# Patient Record
Sex: FEMALE | Race: WHITE | NOT HISPANIC OR LATINO
[De-identification: names, ages, dates, MRNs, and addresses within clinical notes are randomized per-mention and may not be internally consistent; named-entity substitution may affect disease eponyms.]

---

## 2016-05-26 RX ORDER — CARVEDILOL PHOSPHATE 80 MG/1
1 CAPSULE, EXTENDED RELEASE ORAL
Qty: 0 | Refills: 0 | DISCHARGE
Start: 2016-05-26 | End: 2016-06-25

## 2016-05-26 RX ORDER — ATORVASTATIN CALCIUM 80 MG/1
1 TABLET, FILM COATED ORAL
Qty: 0 | Refills: 0 | DISCHARGE
Start: 2016-05-26 | End: 2016-06-25

## 2017-01-13 ENCOUNTER — APPOINTMENT (OUTPATIENT)
Dept: GERIATRICS | Facility: CLINIC | Age: 82
End: 2017-01-13

## 2017-01-13 VITALS
BODY MASS INDEX: 27.38 KG/M2 | SYSTOLIC BLOOD PRESSURE: 142 MMHG | OXYGEN SATURATION: 90 % | DIASTOLIC BLOOD PRESSURE: 90 MMHG | HEART RATE: 61 BPM | HEIGHT: 61 IN | WEIGHT: 145 LBS

## 2017-01-13 DIAGNOSIS — R09.82 POSTNASAL DRIP: ICD-10-CM

## 2017-01-17 ENCOUNTER — APPOINTMENT (OUTPATIENT)
Dept: SURGICAL ONCOLOGY | Facility: CLINIC | Age: 82
End: 2017-01-17

## 2017-01-17 VITALS
SYSTOLIC BLOOD PRESSURE: 120 MMHG | RESPIRATION RATE: 16 BRPM | HEIGHT: 61 IN | DIASTOLIC BLOOD PRESSURE: 78 MMHG | HEART RATE: 60 BPM | BODY MASS INDEX: 27.38 KG/M2 | WEIGHT: 145 LBS

## 2017-07-14 ENCOUNTER — EMERGENCY (EMERGENCY)
Facility: HOSPITAL | Age: 82
LOS: 1 days | Discharge: HOME | End: 2017-07-14
Attending: EMERGENCY MEDICINE
Payer: MEDICARE

## 2017-07-14 VITALS
WEIGHT: 145.95 LBS | SYSTOLIC BLOOD PRESSURE: 153 MMHG | RESPIRATION RATE: 16 BRPM | OXYGEN SATURATION: 97 % | TEMPERATURE: 98 F | HEART RATE: 68 BPM | HEIGHT: 62 IN | DIASTOLIC BLOOD PRESSURE: 93 MMHG

## 2017-07-14 DIAGNOSIS — Z79.82 LONG TERM (CURRENT) USE OF ASPIRIN: ICD-10-CM
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
## 2017-07-14 DIAGNOSIS — Z95.0 PRESENCE OF CARDIAC PACEMAKER: Chronic | ICD-10-CM

## 2017-07-14 DIAGNOSIS — Z41.9 ENCOUNTER FOR PROCEDURE FOR PURPOSES OTHER THAN REMEDYING HEALTH STATE, UNSPECIFIED: Chronic | ICD-10-CM

## 2017-07-14 DIAGNOSIS — S01.01XA LACERATION WITHOUT FOREIGN BODY OF SCALP, INITIAL ENCOUNTER: ICD-10-CM

## 2017-07-14 DIAGNOSIS — Z98.89 OTHER SPECIFIED POSTPROCEDURAL STATES: Chronic | ICD-10-CM
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
## 2017-07-14 DIAGNOSIS — S09.90XA UNSPECIFIED INJURY OF HEAD, INITIAL ENCOUNTER: ICD-10-CM

## 2017-07-14 PROCEDURE — 70450 CT HEAD/BRAIN W/O DYE: CPT

## 2017-07-14 PROCEDURE — 99284 EMERGENCY DEPT VISIT MOD MDM: CPT | Mod: 25

## 2017-07-14 PROCEDURE — 12001 RPR S/N/AX/GEN/TRNK 2.5CM/<: CPT

## 2017-07-14 PROCEDURE — 70450 CT HEAD/BRAIN W/O DYE: CPT | Mod: 26

## 2017-07-14 NOTE — ED ADULT NURSE NOTE - OBJECTIVE STATEMENT
s/p slip and fall off bed and lurched forward and hit my head of the edge of the door, my only concern was the bleeding.

## 2017-07-14 NOTE — ED PROVIDER NOTE - OBJECTIVE STATEMENT
85 year old complaining of pain and laceration to top of head after slipping and falling out of bed today.  no loc.  takes asa once per day  no loc no change in mental status

## 2017-07-14 NOTE — ED PROVIDER NOTE - CARE PLAN
Principal Discharge DX:	Laceration of scalp, initial encounter  Secondary Diagnosis:	Traumatic injury of head, initial encounter

## 2017-07-24 ENCOUNTER — APPOINTMENT (OUTPATIENT)
Dept: GERIATRICS | Facility: CLINIC | Age: 82
End: 2017-07-24

## 2017-07-24 VITALS
DIASTOLIC BLOOD PRESSURE: 60 MMHG | SYSTOLIC BLOOD PRESSURE: 100 MMHG | HEIGHT: 61 IN | BODY MASS INDEX: 27.99 KG/M2 | OXYGEN SATURATION: 96 % | HEART RATE: 89 BPM | RESPIRATION RATE: 16 BRPM | WEIGHT: 148.25 LBS | TEMPERATURE: 97.4 F

## 2017-07-24 VITALS
WEIGHT: 148.25 LBS | TEMPERATURE: 97.4 F | BODY MASS INDEX: 27.99 KG/M2 | DIASTOLIC BLOOD PRESSURE: 60 MMHG | SYSTOLIC BLOOD PRESSURE: 100 MMHG | HEIGHT: 61 IN

## 2017-07-24 RX ORDER — MONTELUKAST 10 MG/1
10 TABLET, FILM COATED ORAL DAILY
Qty: 30 | Refills: 0 | Status: DISCONTINUED | COMMUNITY
Start: 2017-02-17 | End: 2017-07-24

## 2017-07-24 RX ORDER — FUROSEMIDE 40 MG/1
40 TABLET ORAL
Refills: 0 | Status: ACTIVE | COMMUNITY
Start: 2017-07-24

## 2017-07-24 RX ORDER — MOMETASONE 50 UG/1
50 SPRAY, METERED NASAL DAILY
Qty: 17 | Refills: 3 | Status: DISCONTINUED | COMMUNITY
Start: 2017-02-17 | End: 2017-07-24

## 2017-10-17 ENCOUNTER — APPOINTMENT (OUTPATIENT)
Dept: GERIATRICS | Facility: CLINIC | Age: 82
End: 2017-10-17
Payer: MEDICARE

## 2017-10-17 VITALS
RESPIRATION RATE: 16 BRPM | OXYGEN SATURATION: 92 % | BODY MASS INDEX: 27.95 KG/M2 | WEIGHT: 148.04 LBS | SYSTOLIC BLOOD PRESSURE: 130 MMHG | TEMPERATURE: 97.5 F | HEART RATE: 8 BPM | HEIGHT: 61 IN | DIASTOLIC BLOOD PRESSURE: 78 MMHG

## 2017-10-17 DIAGNOSIS — Z23 ENCOUNTER FOR IMMUNIZATION: ICD-10-CM

## 2017-10-17 PROCEDURE — 99214 OFFICE O/P EST MOD 30 MIN: CPT | Mod: 25

## 2017-10-17 PROCEDURE — 90662 IIV NO PRSV INCREASED AG IM: CPT

## 2017-10-17 PROCEDURE — G0008: CPT

## 2017-10-18 PROBLEM — Z23 NEED FOR IMMUNIZATION AGAINST INFLUENZA: Status: ACTIVE | Noted: 2017-10-17

## 2017-12-05 ENCOUNTER — TRANSCRIPTION ENCOUNTER (OUTPATIENT)
Age: 82
End: 2017-12-05

## 2017-12-05 ENCOUNTER — APPOINTMENT (OUTPATIENT)
Dept: SURGICAL ONCOLOGY | Facility: CLINIC | Age: 82
End: 2017-12-05

## 2018-01-23 ENCOUNTER — APPOINTMENT (OUTPATIENT)
Dept: SURGICAL ONCOLOGY | Facility: CLINIC | Age: 83
End: 2018-01-23
Payer: MEDICARE

## 2018-01-23 VITALS
TEMPERATURE: 98.2 F | DIASTOLIC BLOOD PRESSURE: 91 MMHG | HEART RATE: 60 BPM | WEIGHT: 153 LBS | BODY MASS INDEX: 28.91 KG/M2 | SYSTOLIC BLOOD PRESSURE: 165 MMHG

## 2018-01-23 PROCEDURE — 99214 OFFICE O/P EST MOD 30 MIN: CPT

## 2018-03-16 ENCOUNTER — APPOINTMENT (OUTPATIENT)
Dept: GERIATRICS | Facility: CLINIC | Age: 83
End: 2018-03-16
Payer: MEDICARE

## 2018-03-16 VITALS
OXYGEN SATURATION: 95 % | TEMPERATURE: 97.6 F | HEART RATE: 61 BPM | RESPIRATION RATE: 16 BRPM | HEIGHT: 61 IN | DIASTOLIC BLOOD PRESSURE: 84 MMHG | SYSTOLIC BLOOD PRESSURE: 150 MMHG

## 2018-03-16 DIAGNOSIS — K30 FUNCTIONAL DYSPEPSIA: ICD-10-CM

## 2018-03-16 PROCEDURE — 99214 OFFICE O/P EST MOD 30 MIN: CPT | Mod: GC

## 2018-03-16 RX ORDER — GLUCOSAMINE/MSM/CHONDROIT SULF 500-166.6
20 TABLET ORAL
Refills: 0 | Status: ACTIVE | COMMUNITY
Start: 2018-03-16

## 2018-03-29 ENCOUNTER — CLINICAL ADVICE (OUTPATIENT)
Age: 83
End: 2018-03-29

## 2018-06-29 ENCOUNTER — APPOINTMENT (OUTPATIENT)
Dept: GERIATRICS | Facility: CLINIC | Age: 83
End: 2018-06-29
Payer: MEDICARE

## 2018-06-29 VITALS
BODY MASS INDEX: 30.21 KG/M2 | HEIGHT: 61 IN | DIASTOLIC BLOOD PRESSURE: 90 MMHG | RESPIRATION RATE: 16 BRPM | WEIGHT: 160 LBS | HEART RATE: 100 BPM | TEMPERATURE: 97.6 F | OXYGEN SATURATION: 99 % | SYSTOLIC BLOOD PRESSURE: 140 MMHG

## 2018-06-29 DIAGNOSIS — R26.81 UNSTEADINESS ON FEET: ICD-10-CM

## 2018-06-29 DIAGNOSIS — J34.89 OTHER SPECIFIED DISORDERS OF NOSE AND NASAL SINUSES: ICD-10-CM

## 2018-06-29 DIAGNOSIS — Z95.0 PRESENCE OF CARDIAC PACEMAKER: ICD-10-CM

## 2018-06-29 DIAGNOSIS — I35.0 NONRHEUMATIC AORTIC (VALVE) STENOSIS: ICD-10-CM

## 2018-06-29 DIAGNOSIS — E78.5 HYPERLIPIDEMIA, UNSPECIFIED: ICD-10-CM

## 2018-06-29 DIAGNOSIS — M48.061 SPINAL STENOSIS, LUMBAR REGION WITHOUT NEUROGENIC CLAUDICATION: ICD-10-CM

## 2018-06-29 PROCEDURE — 99215 OFFICE O/P EST HI 40 MIN: CPT

## 2018-06-29 RX ORDER — IPRATROPIUM BROMIDE 21 UG/1
0.03 SPRAY NASAL
Qty: 1 | Refills: 0 | Status: DISCONTINUED | COMMUNITY
Start: 2017-07-24 | End: 2018-06-29

## 2018-08-03 ENCOUNTER — APPOINTMENT (OUTPATIENT)
Dept: GERIATRICS | Facility: CLINIC | Age: 83
End: 2018-08-03
Payer: MEDICARE

## 2018-08-03 VITALS
BODY MASS INDEX: 29.83 KG/M2 | WEIGHT: 158 LBS | SYSTOLIC BLOOD PRESSURE: 120 MMHG | HEIGHT: 61 IN | RESPIRATION RATE: 15 BRPM | DIASTOLIC BLOOD PRESSURE: 70 MMHG | TEMPERATURE: 98.2 F | HEART RATE: 69 BPM | OXYGEN SATURATION: 98 %

## 2018-08-03 DIAGNOSIS — E05.90 THYROTOXICOSIS, UNSPECIFIED W/OUT THYROTOXIC CRISIS OR STORM: ICD-10-CM

## 2018-08-03 DIAGNOSIS — R60.0 LOCALIZED EDEMA: ICD-10-CM

## 2018-08-03 DIAGNOSIS — I10 ESSENTIAL (PRIMARY) HYPERTENSION: ICD-10-CM

## 2018-08-03 DIAGNOSIS — F32.9 MAJOR DEPRESSIVE DISORDER, SINGLE EPISODE, UNSPECIFIED: ICD-10-CM

## 2018-08-03 PROCEDURE — 99214 OFFICE O/P EST MOD 30 MIN: CPT | Mod: GC

## 2018-08-06 ENCOUNTER — RESULT REVIEW (OUTPATIENT)
Age: 83
End: 2018-08-06

## 2018-08-06 DIAGNOSIS — R79.89 OTHER SPECIFIED ABNORMAL FINDINGS OF BLOOD CHEMISTRY: ICD-10-CM

## 2018-08-06 LAB
ANION GAP SERPL CALC-SCNC: 17 MMOL/L
BUN SERPL-MCNC: 56 MG/DL
CALCIUM SERPL-MCNC: 9.6 MG/DL
CHLORIDE SERPL-SCNC: 100 MMOL/L
CO2 SERPL-SCNC: 23 MMOL/L
CREAT SERPL-MCNC: 2.05 MG/DL
GLUCOSE SERPL-MCNC: 99 MG/DL
POTASSIUM SERPL-SCNC: 5.3 MMOL/L
SODIUM SERPL-SCNC: 140 MMOL/L

## 2018-08-07 ENCOUNTER — CLINICAL ADVICE (OUTPATIENT)
Age: 83
End: 2018-08-07

## 2018-08-28 ENCOUNTER — RX RENEWAL (OUTPATIENT)
Age: 83
End: 2018-08-28

## 2018-08-28 RX ORDER — SERTRALINE 25 MG/1
25 TABLET, FILM COATED ORAL
Qty: 90 | Refills: 1 | Status: ACTIVE | COMMUNITY
Start: 2018-06-29 | End: 1900-01-01

## 2018-09-14 ENCOUNTER — APPOINTMENT (OUTPATIENT)
Dept: GERIATRICS | Facility: CLINIC | Age: 83
End: 2018-09-14

## 2018-10-10 ENCOUNTER — INPATIENT (INPATIENT)
Facility: HOSPITAL | Age: 83
LOS: 4 days | Discharge: NURSING, REHAB OR LONG-TERM CARE | DRG: 481 | End: 2018-10-15
Attending: HOSPITALIST | Admitting: HOSPITALIST
Payer: MEDICARE

## 2018-10-10 ENCOUNTER — TRANSCRIPTION ENCOUNTER (OUTPATIENT)
Age: 83
End: 2018-10-10

## 2018-10-10 VITALS
OXYGEN SATURATION: 94 % | HEART RATE: 62 BPM | TEMPERATURE: 98 F | RESPIRATION RATE: 17 BRPM | WEIGHT: 179.9 LBS | SYSTOLIC BLOOD PRESSURE: 134 MMHG | DIASTOLIC BLOOD PRESSURE: 74 MMHG

## 2018-10-10 DIAGNOSIS — Z98.89 OTHER SPECIFIED POSTPROCEDURAL STATES: Chronic | ICD-10-CM

## 2018-10-10 DIAGNOSIS — Z41.9 ENCOUNTER FOR PROCEDURE FOR PURPOSES OTHER THAN REMEDYING HEALTH STATE, UNSPECIFIED: Chronic | ICD-10-CM

## 2018-10-10 DIAGNOSIS — Z95.0 PRESENCE OF CARDIAC PACEMAKER: Chronic | ICD-10-CM

## 2018-10-10 DIAGNOSIS — E78.5 HYPERLIPIDEMIA, UNSPECIFIED: ICD-10-CM

## 2018-10-10 DIAGNOSIS — S72.009A FRACTURE OF UNSPECIFIED PART OF NECK OF UNSPECIFIED FEMUR, INITIAL ENCOUNTER FOR CLOSED FRACTURE: ICD-10-CM

## 2018-10-10 DIAGNOSIS — Z29.9 ENCOUNTER FOR PROPHYLACTIC MEASURES, UNSPECIFIED: ICD-10-CM

## 2018-10-10 DIAGNOSIS — N18.9 CHRONIC KIDNEY DISEASE, UNSPECIFIED: ICD-10-CM

## 2018-10-10 DIAGNOSIS — I10 ESSENTIAL (PRIMARY) HYPERTENSION: ICD-10-CM

## 2018-10-10 LAB
ALBUMIN SERPL ELPH-MCNC: 3.5 G/DL — SIGNIFICANT CHANGE UP (ref 3.5–5)
ALP SERPL-CCNC: 61 U/L — SIGNIFICANT CHANGE UP (ref 40–120)
ALT FLD-CCNC: 18 U/L DA — SIGNIFICANT CHANGE UP (ref 10–60)
ANION GAP SERPL CALC-SCNC: 10 MMOL/L — SIGNIFICANT CHANGE UP (ref 5–17)
APTT BLD: 33.7 SEC — SIGNIFICANT CHANGE UP (ref 27.5–37.4)
AST SERPL-CCNC: 20 U/L — SIGNIFICANT CHANGE UP (ref 10–40)
BASOPHILS # BLD AUTO: 0.1 K/UL — SIGNIFICANT CHANGE UP (ref 0–0.2)
BASOPHILS NFR BLD AUTO: 1.1 % — SIGNIFICANT CHANGE UP (ref 0–2)
BILIRUB SERPL-MCNC: 0.6 MG/DL — SIGNIFICANT CHANGE UP (ref 0.2–1.2)
BUN SERPL-MCNC: 71 MG/DL — HIGH (ref 7–18)
CALCIUM SERPL-MCNC: 9.1 MG/DL — SIGNIFICANT CHANGE UP (ref 8.4–10.5)
CHLORIDE SERPL-SCNC: 107 MMOL/L — SIGNIFICANT CHANGE UP (ref 96–108)
CO2 SERPL-SCNC: 23 MMOL/L — SIGNIFICANT CHANGE UP (ref 22–31)
CREAT SERPL-MCNC: 2.04 MG/DL — HIGH (ref 0.5–1.3)
EOSINOPHIL # BLD AUTO: 0.1 K/UL — SIGNIFICANT CHANGE UP (ref 0–0.5)
EOSINOPHIL NFR BLD AUTO: 0.8 % — SIGNIFICANT CHANGE UP (ref 0–6)
GLUCOSE SERPL-MCNC: 108 MG/DL — HIGH (ref 70–99)
HCT VFR BLD CALC: 38.3 % — SIGNIFICANT CHANGE UP (ref 34.5–45)
HGB BLD-MCNC: 12.5 G/DL — SIGNIFICANT CHANGE UP (ref 11.5–15.5)
INR BLD: 1.04 RATIO — SIGNIFICANT CHANGE UP (ref 0.88–1.16)
LYMPHOCYTES # BLD AUTO: 1.5 K/UL — SIGNIFICANT CHANGE UP (ref 1–3.3)
LYMPHOCYTES # BLD AUTO: 11.9 % — LOW (ref 13–44)
MCHC RBC-ENTMCNC: 29.9 PG — SIGNIFICANT CHANGE UP (ref 27–34)
MCHC RBC-ENTMCNC: 32.5 GM/DL — SIGNIFICANT CHANGE UP (ref 32–36)
MCV RBC AUTO: 92 FL — SIGNIFICANT CHANGE UP (ref 80–100)
MONOCYTES # BLD AUTO: 0.6 K/UL — SIGNIFICANT CHANGE UP (ref 0–0.9)
MONOCYTES NFR BLD AUTO: 5.1 % — SIGNIFICANT CHANGE UP (ref 2–14)
NEUTROPHILS # BLD AUTO: 10.3 K/UL — HIGH (ref 1.8–7.4)
NEUTROPHILS NFR BLD AUTO: 81.1 % — HIGH (ref 43–77)
PLATELET # BLD AUTO: 234 K/UL — SIGNIFICANT CHANGE UP (ref 150–400)
POTASSIUM SERPL-MCNC: 4.6 MMOL/L — SIGNIFICANT CHANGE UP (ref 3.5–5.3)
POTASSIUM SERPL-SCNC: 4.6 MMOL/L — SIGNIFICANT CHANGE UP (ref 3.5–5.3)
PROT SERPL-MCNC: 7.4 G/DL — SIGNIFICANT CHANGE UP (ref 6–8.3)
PROTHROM AB SERPL-ACNC: 11.4 SEC — SIGNIFICANT CHANGE UP (ref 9.8–12.7)
RBC # BLD: 4.17 M/UL — SIGNIFICANT CHANGE UP (ref 3.8–5.2)
RBC # FLD: 14 % — SIGNIFICANT CHANGE UP (ref 10.3–14.5)
SODIUM SERPL-SCNC: 140 MMOL/L — SIGNIFICANT CHANGE UP (ref 135–145)
WBC # BLD: 12.7 K/UL — HIGH (ref 3.8–10.5)
WBC # FLD AUTO: 12.7 K/UL — HIGH (ref 3.8–10.5)

## 2018-10-10 PROCEDURE — 99285 EMERGENCY DEPT VISIT HI MDM: CPT

## 2018-10-10 PROCEDURE — 70450 CT HEAD/BRAIN W/O DYE: CPT | Mod: 26

## 2018-10-10 PROCEDURE — 72192 CT PELVIS W/O DYE: CPT | Mod: 26

## 2018-10-10 PROCEDURE — 71045 X-RAY EXAM CHEST 1 VIEW: CPT | Mod: 26

## 2018-10-10 PROCEDURE — 73551 X-RAY EXAM OF FEMUR 1: CPT | Mod: 26,RT

## 2018-10-10 PROCEDURE — 99223 1ST HOSP IP/OBS HIGH 75: CPT | Mod: GC

## 2018-10-10 RX ORDER — INFLUENZA VIRUS VACCINE 15; 15; 15; 15 UG/.5ML; UG/.5ML; UG/.5ML; UG/.5ML
0.5 SUSPENSION INTRAMUSCULAR ONCE
Qty: 0 | Refills: 0 | Status: DISCONTINUED | OUTPATIENT
Start: 2018-10-10 | End: 2018-10-11

## 2018-10-10 RX ORDER — FEXOFENADINE HCL 30 MG
0 TABLET ORAL
Qty: 0 | Refills: 0 | COMMUNITY

## 2018-10-10 RX ORDER — TRAMADOL HYDROCHLORIDE 50 MG/1
25 TABLET ORAL EVERY 8 HOURS
Qty: 0 | Refills: 0 | Status: DISCONTINUED | OUTPATIENT
Start: 2018-10-10 | End: 2018-10-11

## 2018-10-10 RX ORDER — PANTOPRAZOLE SODIUM 20 MG/1
40 TABLET, DELAYED RELEASE ORAL
Qty: 0 | Refills: 0 | Status: DISCONTINUED | OUTPATIENT
Start: 2018-10-10 | End: 2018-10-11

## 2018-10-10 RX ORDER — MORPHINE SULFATE 50 MG/1
0.5 CAPSULE, EXTENDED RELEASE ORAL EVERY 4 HOURS
Qty: 0 | Refills: 0 | Status: DISCONTINUED | OUTPATIENT
Start: 2018-10-10 | End: 2018-10-11

## 2018-10-10 RX ORDER — ASPIRIN/CALCIUM CARB/MAGNESIUM 324 MG
81 TABLET ORAL DAILY
Qty: 0 | Refills: 0 | Status: DISCONTINUED | OUTPATIENT
Start: 2018-10-10 | End: 2018-10-11

## 2018-10-10 RX ORDER — CARVEDILOL PHOSPHATE 80 MG/1
0 CAPSULE, EXTENDED RELEASE ORAL
Qty: 0 | Refills: 0 | COMMUNITY

## 2018-10-10 RX ORDER — FUROSEMIDE 40 MG
1 TABLET ORAL
Qty: 0 | Refills: 0 | COMMUNITY

## 2018-10-10 RX ORDER — SERTRALINE 25 MG/1
25 TABLET, FILM COATED ORAL AT BEDTIME
Qty: 0 | Refills: 0 | Status: DISCONTINUED | OUTPATIENT
Start: 2018-10-10 | End: 2018-10-11

## 2018-10-10 RX ORDER — OMEPRAZOLE 10 MG/1
1 CAPSULE, DELAYED RELEASE ORAL
Qty: 0 | Refills: 0 | COMMUNITY

## 2018-10-10 RX ORDER — ATORVASTATIN CALCIUM 80 MG/1
20 TABLET, FILM COATED ORAL AT BEDTIME
Qty: 0 | Refills: 0 | Status: DISCONTINUED | OUTPATIENT
Start: 2018-10-10 | End: 2018-10-11

## 2018-10-10 RX ORDER — HEPARIN SODIUM 5000 [USP'U]/ML
5000 INJECTION INTRAVENOUS; SUBCUTANEOUS EVERY 8 HOURS
Qty: 0 | Refills: 0 | Status: DISCONTINUED | OUTPATIENT
Start: 2018-10-10 | End: 2018-10-11

## 2018-10-10 RX ORDER — CHOLECALCIFEROL (VITAMIN D3) 125 MCG
2000 CAPSULE ORAL DAILY
Qty: 0 | Refills: 0 | Status: DISCONTINUED | OUTPATIENT
Start: 2018-10-10 | End: 2018-10-11

## 2018-10-10 RX ORDER — ACETAMINOPHEN 500 MG
650 TABLET ORAL EVERY 6 HOURS
Qty: 0 | Refills: 0 | Status: DISCONTINUED | OUTPATIENT
Start: 2018-10-10 | End: 2018-10-11

## 2018-10-10 RX ORDER — ASPIRIN/CALCIUM CARB/MAGNESIUM 324 MG
1 TABLET ORAL
Qty: 0 | Refills: 0 | COMMUNITY

## 2018-10-10 RX ORDER — ACETAMINOPHEN 500 MG
650 TABLET ORAL ONCE
Qty: 0 | Refills: 0 | Status: COMPLETED | OUTPATIENT
Start: 2018-10-10 | End: 2018-10-10

## 2018-10-10 RX ORDER — FUROSEMIDE 40 MG
60 TABLET ORAL DAILY
Qty: 0 | Refills: 0 | Status: DISCONTINUED | OUTPATIENT
Start: 2018-10-10 | End: 2018-10-11

## 2018-10-10 RX ORDER — CARVEDILOL PHOSPHATE 80 MG/1
6.25 CAPSULE, EXTENDED RELEASE ORAL
Qty: 0 | Refills: 0 | Status: DISCONTINUED | OUTPATIENT
Start: 2018-10-10 | End: 2018-10-11

## 2018-10-10 RX ORDER — CARVEDILOL PHOSPHATE 80 MG/1
3.12 CAPSULE, EXTENDED RELEASE ORAL
Qty: 0 | Refills: 0 | Status: DISCONTINUED | OUTPATIENT
Start: 2018-10-10 | End: 2018-10-11

## 2018-10-10 RX ORDER — MORPHINE SULFATE 50 MG/1
4 CAPSULE, EXTENDED RELEASE ORAL ONCE
Qty: 0 | Refills: 0 | Status: DISCONTINUED | OUTPATIENT
Start: 2018-10-10 | End: 2018-10-10

## 2018-10-10 RX ORDER — FLUTICASONE PROPIONATE 220 MCG
0 AEROSOL WITH ADAPTER (GRAM) INHALATION
Qty: 0 | Refills: 0 | COMMUNITY

## 2018-10-10 RX ORDER — SODIUM CHLORIDE 9 MG/ML
500 INJECTION INTRAMUSCULAR; INTRAVENOUS; SUBCUTANEOUS
Qty: 0 | Refills: 0 | Status: DISCONTINUED | OUTPATIENT
Start: 2018-10-10 | End: 2018-10-11

## 2018-10-10 RX ADMIN — SERTRALINE 25 MILLIGRAM(S): 25 TABLET, FILM COATED ORAL at 21:23

## 2018-10-10 RX ADMIN — HEPARIN SODIUM 5000 UNIT(S): 5000 INJECTION INTRAVENOUS; SUBCUTANEOUS at 21:23

## 2018-10-10 RX ADMIN — ATORVASTATIN CALCIUM 20 MILLIGRAM(S): 80 TABLET, FILM COATED ORAL at 21:23

## 2018-10-10 RX ADMIN — Medication 650 MILLIGRAM(S): at 16:30

## 2018-10-10 RX ADMIN — CARVEDILOL PHOSPHATE 6.25 MILLIGRAM(S): 80 CAPSULE, EXTENDED RELEASE ORAL at 21:23

## 2018-10-10 RX ADMIN — Medication 650 MILLIGRAM(S): at 17:00

## 2018-10-10 RX ADMIN — MORPHINE SULFATE 0.5 MILLIGRAM(S): 50 CAPSULE, EXTENDED RELEASE ORAL at 21:22

## 2018-10-10 NOTE — ED PROVIDER NOTE - PMH
Complete heart block  2010  Endogenous hyperlipemia    Essential hypertension    Hypertension    PAF (paroxysmal atrial fibrillation)  1/7/2016  Thyroid nodule

## 2018-10-10 NOTE — ED ADULT NURSE NOTE - ED STAT RN HANDOFF DETAILS
endorsed pt to RN Fallon in B1 in stable condition for continuation of care. pt a&ox3, admitted for right hip fracture. 20G LAC.

## 2018-10-10 NOTE — CONSULT NOTE ADULT - SUBJECTIVE AND OBJECTIVE BOX
[place]  [id number]     ORTHOPEDIC CONSULT:    Diagnosis:  Rt Hip subtrochanteric Fracture     89yFemaleHPI:  ortho called to evaluate pt in ER. JOSLYN today s/p fall while [place] onto her right hip with no head trauma as per pt.  pain of the right hip increases with weight bearing and ROM. Pt cannot ambulate nor bear weight.   Pt denies Chest pain, SOB, dyspnea, paresthesias, N/V/D, abdominal pain, syncope, or pain anywhere else.   +community ambulator with cane at baseline.      Vital Signs Last 24 Hrs  T(C): 36.7 (10 Oct 2018 14:55), Max: 36.7 (10 Oct 2018 14:55)  T(F): 98 (10 Oct 2018 14:55), Max: 98 (10 Oct 2018 14:55)  HR: 62 (10 Oct 2018 14:55) (62 - 62)  BP: 134/74 (10 Oct 2018 14:55) (134/74 - 134/74)  BP(mean): --  RR: 17 (10 Oct 2018 14:55) (17 - 17)  SpO2: 94% (10 Oct 2018 14:55) (94% - 94%)  I&O's Detail      Physical Exam:    General: AAOx3, NAD, resting comfortably in bed.    Rt Hip:   Skin is pink and warm. Tender at the fracture site. Decreased ROM of the affected hip due to pain. SILT.  Positive logroll test.  Lower extremity:  No calf tenderness, calves are soft. 2+pulses. NVI. 5/5 Strength of EHL/TA/gastrocnemius B/L.  Good capillary refill. Warm, well-perfused.                           12.5   12.7  )-----------( 234      ( 10 Oct 2018 18:08 )             38.3     10-10    140  |  107  |  71<H>  ----------------------------<  108<H>  4.6   |  23  |  2.04<H>    Ca    9.1      10 Oct 2018 18:08    TPro  7.4  /  Alb  3.5  /  TBili  0.6  /  DBili  x   /  AST  20  /  ALT  18  /  AlkPhos  61  10-10    PT/INR - ( 10 Oct 2018 18:08 )   PT: 11.4 sec;   INR: 1.04 ratio         PTT - ( 10 Oct 2018 18:08 )  PTT:33.7 sec    Radiology:  < from: Xray Femur 1 View, Right (10.10.18 @ 16:03) >    EXAM:  XR FEMUR 1 VIEW RT                            PROCEDURE DATE:  10/10/2018          INTERPRETATION:  Right femur. Patient had a fall with local trauma. 4   images obtained.    Arterial calcifications are noted.    There is a comminuted intertrochanteric fracture with comminution of the   lesser trochanter and upward displacement at the fracture site. There is   mild spurring of the outer right acetabular corner.    There is mild knee degeneration.    IMPRESSION: Intertrochanteric fractureas above.                ALEC LOWRY M.D., ATTENDING RADIOLOGIST  This document has been electronically signed. Oct 10 2018  4:05PM                < end of copied text >      Impression:  89yFemale with _Rt_ Hip subtrochanteric Fracture    Plan:  -  Pain management  -  Dvt prophylaxis with Venodynes  -  Keep NPO after midnight tonight  -  Surgeon:  Dr. Bravo  -  Procedure:  Rt hip IM nailing  -  For Surgery possibly today if ok with patient.  -  Medical Optimization/   -  pt and family (son) requesting possible [facility] [place] [facility] [place]  -  Consent: Pending  -  Case d/w   - Fracture care, bedrest. NWB of the affected extremity.

## 2018-10-10 NOTE — H&P ADULT - HISTORY OF PRESENT ILLNESS
89 y/o Female from home lives alone and walk with walker pt w/ hx of HTN, endogenous hyperlipemia, paroxysmal Afib, cardiac pacemaker (Dr. Steven Goldberg), arthritis, macular degeneration, cataract P/w c/o R leg pain 7/10 in intensity, sharp, continuous, non radiating, s/p fall x today. Pt was at [facility] when she had mechanical fall onto R side after her foot got stuck. Worse when moving R leg. Better when not moving. Denies any Dizziness, Palpitations, or any sensation changes before she fell down.  Denies LOC, head trauma, HA, abd pain, CP, and any other complaints. NKDA, Pt denied Smoking and alcohol drinking.      In the ED pt got tylenol it improved pain little bit, pt had CT scan of head which was negative for acute intracranial pathology, CT bony pelvis showed Acute comminuted angulated right intertrochanteric fracture.   GOC discussed with Son: pt DNR, not DNI.

## 2018-10-10 NOTE — ED ADULT NURSE NOTE - ED STAT RN HANDOFF DETAILS 2
received pt.in bed at 1910 pt. is  awake and responsive.c/o rt.hip pain.morphine ivp  given as ordered. transfer to  519B report given to skye foreman.pt,not in distress

## 2018-10-10 NOTE — ED PROVIDER NOTE - PSH
Elective surgery  excision of thyroid nodule  History of cardiac pacemaker  2010  No significant past surgical history    Status post pericardiocentesis  5/18/2016

## 2018-10-10 NOTE — H&P ADULT - PROBLEM SELECTOR PLAN 4
IMPROVE VTE Individual Risk Assessment    RISK                                                                Points    [  ] Previous VTE                                                  3  [  ] Thrombophilia                                               2  [  ] Lower limb paralysis                                      2        (unable to hold up >15 seconds)    [  ] Current Cancer                                              2         (within 6 months)  [x  ] Immobilization > 24 hrs                                1  [  ] ICU/CCU stay > 24 hours                              1  [x  ] Age > 60                                                      1  IMPROVE VTE Score 2 will give heparin sc for DVT ppx and Pantoprazole for GI ppx Creatinine 2.04  egfr: 21 (stage 4)  F/u BMP in am

## 2018-10-10 NOTE — H&P ADULT - ATTENDING COMMENTS
Patient seen/evaluated at bedside in the ED 10/10/18. I agree with the resident progress note/outlined plan of care. My independent findings and conclusions are documented.    89 y/o f w/ pmhx of severe aortic stenosis, CHF/LV dysfunction, atrial fibrillation (no AC), h/o complete heart block s/p PPM, CKD,  p/w w/ right intertrochanteric fracture s/p mechanical fall. Pt denies chest pain, sob, LH, palpitations, orthopnea/PND. No nausea/vomiting. PPM was interrogated 2 months ago. Per son was informed there were no events noted and that there were 4 yrs of battery life remaining.   Son is considering possible transfer to [place] where patient's cardiologist is based.  Son will decide then whether to transfer or proceed with recommended IM nailing at [facility].  Patient had nuclear stress test in 2016 suggestive of mild ananth-infarct    pmhx/meds/all/ros/famhx/sochx/surghx   physical exam as per details of the H&P  AAOx3 NAD  CTAB/l no accessory muscle use  S1S2 RRR II/VI SM  soft, NT, ND, + BS, no guarding/rebound  RLE edematous, shortened and externally rotated    nuclear stress test May 2016  Abnormal Study  * Chest Pain: No chest pain with administration of  Regadenoson.  * Symptom: flushing.  * HR Response: Appropriate.  * BP Response: Appropriate.  * Heart Rhythm: Paced Rhythm - 78 BPM.  * ECG Abnormalities: ECG changes could not be interpreted  due to paced rhythm.  * Arrhythmia: None.  * The left ventricle was enlarged. There is a  medium-sized, mild defect in the mid to distal  anteroseptal wall that is reversible suggestive of  ischemia.  There is a small, moderate defect in the apex  that is mostly fixed suggestive of infarct with mild  ananth-infarct ischemia.  There is a small, mild defect in  the basal to mid inferoseptal walls that is partially  reversible suggestive of mild ischemia with a small area  of scar in the basal inferoseptum.  * Post-stress gated wall motion analysis was performed  (LVEF = 26 %;LVEDV = 166 ml.) revealing globally reduced  left ventricular systolic function with paradoxical septal  motion consistent with a paced rhythm.  *** No previous Nuclear/Stress exam.    1. R intertrochanteric fracture status post mechanical fall  2. right hip pain  3. NEELAM on CkD stage 3-4  4. h/o severe aortic stenosis  5. CHF severe dysfunction  6. HTN  7. vitamin D deficiency  8. HLD    hemodynamically stable. no evidence of cardiac decompensation  scheduled tylenol x 24 hours then prn, tramadol 25mg q 8 hours prn  -official pain management consult in am  in light of renal failure, change morphine to low dose dilaudid 0.25 for severe pain  -repeat BMP in am, hold nephrotoxic agents and diuretics  -urinalysis  -c/w coreg, statin, asa post op  -cardiology consult, Dr. Rubio for preoperative clearance  -hold heparin in am  -NPO P MN, in event pt proceeds to intervention tomorrow  -home regimen of vitamin d supplementation Patient seen/evaluated at bedside in the ED 10/10/18. I agree with the resident progress note/outlined plan of care. My independent findings and conclusions are documented.    89 y/o f w/ pmhx of severe aortic stenosis, CHF/LV dysfunction, atrial fibrillation (no AC), h/o complete heart block s/p PPM, CKD,  p/w w/ right intertrochanteric fracture s/p mechanical fall. Pt denies chest pain, sob, LH, palpitations, orthopnea/PND. No nausea/vomiting. PPM was interrogated 2 months ago. Per son was informed there were no events noted and that there were 4 yrs of battery life remaining.   Son is considering possible transfer to [place] where patient's cardiologist is based.  Son will decide then whether to transfer or proceed with recommended IM nailing at [facility].  Patient had nuclear stress test in 2016 suggestive of mild ananth-infarct    pmhx/meds/all/ros/famhx/sochx/surghx   physical exam as per details of the H&P  AAOx3 NAD  CTAB/l no accessory muscle use  S1S2 RRR II/VI SM  soft, NT, ND, + BS, no guarding/rebound  RLE edematous, shortened and externally rotated    nuclear stress test May 2016  Abnormal Study  * Chest Pain: No chest pain with administration of  Regadenoson.  * Symptom: flushing.  * HR Response: Appropriate.  * BP Response: Appropriate.  * Heart Rhythm: Paced Rhythm - 78 BPM.  * ECG Abnormalities: ECG changes could not be interpreted  due to paced rhythm.  * Arrhythmia: None.  * The left ventricle was enlarged. There is a  medium-sized, mild defect in the mid to distal  anteroseptal wall that is reversible suggestive of  ischemia.  There is a small, moderate defect in the apex  that is mostly fixed suggestive of infarct with mild  ananth-infarct ischemia.  There is a small, mild defect in  the basal to mid inferoseptal walls that is partially  reversible suggestive of mild ischemia with a small area  of scar in the basal inferoseptum.  * Post-stress gated wall motion analysis was performed  (LVEF = 26 %;LVEDV = 166 ml.) revealing globally reduced  left ventricular systolic function with paradoxical septal  motion consistent with a paced rhythm.  *** No previous Nuclear/Stress exam.    1. R intertrochanteric fracture status post mechanical fall  2. right hip pain  3. NEELAM on CkD stage 3-4  4. h/o severe aortic stenosis  5. CHF severe dysfunction  6. HTN  7. vitamin D deficiency  8. HLD  9. abnormal cxr  hemodynamically stable. no evidence of cardiac decompensation  scheduled tylenol x 24 hours then prn, tramadol 25mg q 8 hours prn  -official pain management consult in am  in light of renal failure, change morphine to low dose dilaudid 0.25 for severe pain  -repeat BMP in am, hold nephrotoxic agents including ACE-I/ARB and diuretics  -urinalysis  -c/w coreg, statin, asa post op  -cardiology consult, Dr. Rubio for preoperative clearance  -hold heparin in am, start lovenox post operatively  -NPO P MN, in event pt proceeds to intervention tomorrow  -home regimen of vitamin d supplementation  -?emphysematous changes on cxr-->though patient denies smoking history, start scheduled bronchodilators x 24 hours to prevent anesthesia induction triggered bronchospasm  -ct chest

## 2018-10-10 NOTE — H&P ADULT - PROBLEM SELECTOR PLAN 1
S/p Mechanical fall   Xray and CT bony pelvis:  Right intertrochanteric fracture   Ortho consult noted: Plan is to do Intramedullary nailing  Son wants to wait for the surgery until he speaks with pt's cardiologist Dr. Steven Goldberg   Will continue with Pain meds:   Tylenol q6  Tramdol 25mg q 8 for mod pain  morphine 0.5mg q 4 for severe pain  Will get cardiology consult for medical optimization f/u Dr. Valdivia   F/u Echo (Previous Echo 2016, Severe AS, global LV systolic dysfunction) S/p Mechanical fall   Xray and CT bony pelvis:  Right intertrochanteric fracture   Ortho consult noted: Plan is to do Intramedullary nailing  Son wants to wait for the surgery until he speaks with pt's cardiologist Dr. Steven Goldberg and might request for transfer.   Will continue with Pain meds:   Tylenol q6  Tramdol 25mg q 8 for mod pain  morphine 0.5mg q 4 for severe pain  Will get cardiology consult for medical optimization f/u Dr. Valdivia   F/u Echo (Previous Echo 2016, Severe AS, global LV systolic dysfunction)

## 2018-10-10 NOTE — ED ADULT TRIAGE NOTE - CHIEF COMPLAINT QUOTE
right upper thigh pain s/p trip and fall. patient denies hitting head, denies, dizziness, denies LOC

## 2018-10-10 NOTE — H&P ADULT - ASSESSMENT
89 y/o Female from home lives alone and walk with walker pt w/ hx of HTN, endogenous hyperlipemia, paroxysmal Afib, cardiac pacemaker (Dr. Steven Goldberg), arthritis, macular degeneration, cataract P/w c/o R leg pain after s/p mechanical fall pt was admitted for right intertrochanteric fracture.

## 2018-10-10 NOTE — ED ADULT NURSE NOTE - OBJECTIVE STATEMENT
pt a&ox3, BIBEMS s/p fall. pt states she "was at [facility] when she tripped over someone's walker". denies head trauma or LOC. denies n/v, dizziness.

## 2018-10-10 NOTE — H&P ADULT - PROBLEM SELECTOR PLAN 5
IMPROVE VTE Individual Risk Assessment    RISK                                                                Points    [  ] Previous VTE                                                  3  [  ] Thrombophilia                                               2  [  ] Lower limb paralysis                                      2        (unable to hold up >15 seconds)    [  ] Current Cancer                                              2         (within 6 months)  [x  ] Immobilization > 24 hrs                                1  [  ] ICU/CCU stay > 24 hours                              1  [x  ] Age > 60                                                      1  IMPROVE VTE Score 2 will give heparin sc for DVT ppx and Pantoprazole for GI ppx

## 2018-10-11 LAB
24R-OH-CALCIDIOL SERPL-MCNC: 23.9 NG/ML — LOW (ref 30–80)
ALBUMIN SERPL ELPH-MCNC: 3.1 G/DL — LOW (ref 3.5–5)
ALP SERPL-CCNC: 67 U/L — SIGNIFICANT CHANGE UP (ref 40–120)
ALT FLD-CCNC: 74 U/L DA — HIGH (ref 10–60)
ANION GAP SERPL CALC-SCNC: 12 MMOL/L — SIGNIFICANT CHANGE UP (ref 5–17)
ANION GAP SERPL CALC-SCNC: 8 MMOL/L — SIGNIFICANT CHANGE UP (ref 5–17)
AST SERPL-CCNC: 125 U/L — HIGH (ref 10–40)
BASOPHILS # BLD AUTO: 0.1 K/UL — SIGNIFICANT CHANGE UP (ref 0–0.2)
BASOPHILS NFR BLD AUTO: 1 % — SIGNIFICANT CHANGE UP (ref 0–2)
BILIRUB SERPL-MCNC: 0.6 MG/DL — SIGNIFICANT CHANGE UP (ref 0.2–1.2)
BUN SERPL-MCNC: 71 MG/DL — HIGH (ref 7–18)
BUN SERPL-MCNC: 76 MG/DL — HIGH (ref 7–18)
CALCIUM SERPL-MCNC: 7.1 MG/DL — LOW (ref 8.4–10.5)
CALCIUM SERPL-MCNC: 8.5 MG/DL — SIGNIFICANT CHANGE UP (ref 8.4–10.5)
CHLORIDE SERPL-SCNC: 104 MMOL/L — SIGNIFICANT CHANGE UP (ref 96–108)
CHLORIDE SERPL-SCNC: 113 MMOL/L — HIGH (ref 96–108)
CHOLEST SERPL-MCNC: 342 MG/DL — HIGH (ref 10–199)
CO2 SERPL-SCNC: 21 MMOL/L — LOW (ref 22–31)
CO2 SERPL-SCNC: 21 MMOL/L — LOW (ref 22–31)
CREAT SERPL-MCNC: 2.13 MG/DL — HIGH (ref 0.5–1.3)
CREAT SERPL-MCNC: 2.48 MG/DL — HIGH (ref 0.5–1.3)
EOSINOPHIL # BLD AUTO: 0.1 K/UL — SIGNIFICANT CHANGE UP (ref 0–0.5)
EOSINOPHIL NFR BLD AUTO: 1.4 % — SIGNIFICANT CHANGE UP (ref 0–6)
FOLATE SERPL-MCNC: >20 NG/ML — SIGNIFICANT CHANGE UP
GLUCOSE SERPL-MCNC: 105 MG/DL — HIGH (ref 70–99)
GLUCOSE SERPL-MCNC: 147 MG/DL — HIGH (ref 70–99)
HBA1C BLD-MCNC: 5.4 % — SIGNIFICANT CHANGE UP (ref 4–5.6)
HCT VFR BLD CALC: 26.8 % — LOW (ref 34.5–45)
HCT VFR BLD CALC: 33.7 % — LOW (ref 34.5–45)
HDLC SERPL-MCNC: 30 MG/DL — LOW
HGB BLD-MCNC: 11 G/DL — LOW (ref 11.5–15.5)
HGB BLD-MCNC: 8.6 G/DL — LOW (ref 11.5–15.5)
LIPID PNL WITH DIRECT LDL SERPL: 236 MG/DL — SIGNIFICANT CHANGE UP
LYMPHOCYTES # BLD AUTO: 1.7 K/UL — SIGNIFICANT CHANGE UP (ref 1–3.3)
LYMPHOCYTES # BLD AUTO: 15.9 % — SIGNIFICANT CHANGE UP (ref 13–44)
MAGNESIUM SERPL-MCNC: 2.7 MG/DL — HIGH (ref 1.6–2.6)
MCHC RBC-ENTMCNC: 30.1 PG — SIGNIFICANT CHANGE UP (ref 27–34)
MCHC RBC-ENTMCNC: 30.5 PG — SIGNIFICANT CHANGE UP (ref 27–34)
MCHC RBC-ENTMCNC: 32.2 GM/DL — SIGNIFICANT CHANGE UP (ref 32–36)
MCHC RBC-ENTMCNC: 32.6 GM/DL — SIGNIFICANT CHANGE UP (ref 32–36)
MCV RBC AUTO: 93.6 FL — SIGNIFICANT CHANGE UP (ref 80–100)
MCV RBC AUTO: 93.6 FL — SIGNIFICANT CHANGE UP (ref 80–100)
MONOCYTES # BLD AUTO: 1 K/UL — HIGH (ref 0–0.9)
MONOCYTES NFR BLD AUTO: 9.3 % — SIGNIFICANT CHANGE UP (ref 2–14)
NEUTROPHILS # BLD AUTO: 7.5 K/UL — HIGH (ref 1.8–7.4)
NEUTROPHILS NFR BLD AUTO: 72.4 % — SIGNIFICANT CHANGE UP (ref 43–77)
PHOSPHATE SERPL-MCNC: 5 MG/DL — HIGH (ref 2.5–4.5)
PLATELET # BLD AUTO: 152 K/UL — SIGNIFICANT CHANGE UP (ref 150–400)
PLATELET # BLD AUTO: 216 K/UL — SIGNIFICANT CHANGE UP (ref 150–400)
POTASSIUM SERPL-MCNC: 4.3 MMOL/L — SIGNIFICANT CHANGE UP (ref 3.5–5.3)
POTASSIUM SERPL-MCNC: 5.1 MMOL/L — SIGNIFICANT CHANGE UP (ref 3.5–5.3)
POTASSIUM SERPL-SCNC: 4.3 MMOL/L — SIGNIFICANT CHANGE UP (ref 3.5–5.3)
POTASSIUM SERPL-SCNC: 5.1 MMOL/L — SIGNIFICANT CHANGE UP (ref 3.5–5.3)
PROT SERPL-MCNC: 6.8 G/DL — SIGNIFICANT CHANGE UP (ref 6–8.3)
RBC # BLD: 2.86 M/UL — LOW (ref 3.8–5.2)
RBC # BLD: 3.6 M/UL — LOW (ref 3.8–5.2)
RBC # FLD: 14.4 % — SIGNIFICANT CHANGE UP (ref 10.3–14.5)
RBC # FLD: 14.4 % — SIGNIFICANT CHANGE UP (ref 10.3–14.5)
SODIUM SERPL-SCNC: 137 MMOL/L — SIGNIFICANT CHANGE UP (ref 135–145)
SODIUM SERPL-SCNC: 142 MMOL/L — SIGNIFICANT CHANGE UP (ref 135–145)
TOTAL CHOLESTEROL/HDL RATIO MEASUREMENT: 11.4 RATIO — HIGH (ref 3.3–7.1)
TRIGL SERPL-MCNC: 378 MG/DL — HIGH (ref 10–149)
TSH SERPL-MCNC: 7.93 UU/ML — HIGH (ref 0.34–4.82)
VIT B12 SERPL-MCNC: 699 PG/ML — SIGNIFICANT CHANGE UP (ref 232–1245)
WBC # BLD: 10.4 K/UL — SIGNIFICANT CHANGE UP (ref 3.8–10.5)
WBC # BLD: 9.6 K/UL — SIGNIFICANT CHANGE UP (ref 3.8–10.5)
WBC # FLD AUTO: 10.4 K/UL — SIGNIFICANT CHANGE UP (ref 3.8–10.5)
WBC # FLD AUTO: 9.6 K/UL — SIGNIFICANT CHANGE UP (ref 3.8–10.5)

## 2018-10-11 PROCEDURE — 27759 TREATMENT OF TIBIA FRACTURE: CPT | Mod: AS

## 2018-10-11 PROCEDURE — 99233 SBSQ HOSP IP/OBS HIGH 50: CPT | Mod: GC

## 2018-10-11 PROCEDURE — 99222 1ST HOSP IP/OBS MODERATE 55: CPT

## 2018-10-11 RX ORDER — ONDANSETRON 8 MG/1
4 TABLET, FILM COATED ORAL ONCE
Qty: 0 | Refills: 0 | Status: DISCONTINUED | OUTPATIENT
Start: 2018-10-11 | End: 2018-10-11

## 2018-10-11 RX ORDER — DOCUSATE SODIUM 100 MG
100 CAPSULE ORAL THREE TIMES A DAY
Qty: 0 | Refills: 0 | Status: DISCONTINUED | OUTPATIENT
Start: 2018-10-11 | End: 2018-10-15

## 2018-10-11 RX ORDER — CEFAZOLIN SODIUM 1 G
1000 VIAL (EA) INJECTION EVERY 8 HOURS
Qty: 0 | Refills: 0 | Status: COMPLETED | OUTPATIENT
Start: 2018-10-12 | End: 2018-10-12

## 2018-10-11 RX ORDER — HYDROMORPHONE HYDROCHLORIDE 2 MG/ML
0.25 INJECTION INTRAMUSCULAR; INTRAVENOUS; SUBCUTANEOUS EVERY 6 HOURS
Qty: 0 | Refills: 0 | Status: DISCONTINUED | OUTPATIENT
Start: 2018-10-11 | End: 2018-10-15

## 2018-10-11 RX ORDER — ACETAMINOPHEN 500 MG
650 TABLET ORAL EVERY 6 HOURS
Qty: 0 | Refills: 0 | Status: DISCONTINUED | OUTPATIENT
Start: 2018-10-11 | End: 2018-10-15

## 2018-10-11 RX ORDER — ASCORBIC ACID 60 MG
500 TABLET,CHEWABLE ORAL
Qty: 0 | Refills: 0 | Status: DISCONTINUED | OUTPATIENT
Start: 2018-10-11 | End: 2018-10-15

## 2018-10-11 RX ORDER — ENOXAPARIN SODIUM 100 MG/ML
40 INJECTION SUBCUTANEOUS EVERY 24 HOURS
Qty: 0 | Refills: 0 | Status: DISCONTINUED | OUTPATIENT
Start: 2018-10-11 | End: 2018-10-15

## 2018-10-11 RX ORDER — ATORVASTATIN CALCIUM 80 MG/1
20 TABLET, FILM COATED ORAL AT BEDTIME
Qty: 0 | Refills: 0 | Status: DISCONTINUED | OUTPATIENT
Start: 2018-10-11 | End: 2018-10-12

## 2018-10-11 RX ORDER — SODIUM CHLORIDE 9 MG/ML
1000 INJECTION INTRAMUSCULAR; INTRAVENOUS; SUBCUTANEOUS
Qty: 0 | Refills: 0 | Status: DISCONTINUED | OUTPATIENT
Start: 2018-10-11 | End: 2018-10-13

## 2018-10-11 RX ORDER — HYDROMORPHONE HYDROCHLORIDE 2 MG/ML
0.3 INJECTION INTRAMUSCULAR; INTRAVENOUS; SUBCUTANEOUS
Qty: 0 | Refills: 0 | Status: DISCONTINUED | OUTPATIENT
Start: 2018-10-11 | End: 2018-10-11

## 2018-10-11 RX ORDER — CARVEDILOL PHOSPHATE 80 MG/1
6.25 CAPSULE, EXTENDED RELEASE ORAL
Qty: 0 | Refills: 0 | Status: DISCONTINUED | OUTPATIENT
Start: 2018-10-11 | End: 2018-10-15

## 2018-10-11 RX ORDER — MORPHINE SULFATE 50 MG/1
2 CAPSULE, EXTENDED RELEASE ORAL EVERY 4 HOURS
Qty: 0 | Refills: 0 | Status: DISCONTINUED | OUTPATIENT
Start: 2018-10-11 | End: 2018-10-15

## 2018-10-11 RX ORDER — OXYCODONE HYDROCHLORIDE 5 MG/1
5 TABLET ORAL EVERY 4 HOURS
Qty: 0 | Refills: 0 | Status: DISCONTINUED | OUTPATIENT
Start: 2018-10-11 | End: 2018-10-15

## 2018-10-11 RX ORDER — ASPIRIN/CALCIUM CARB/MAGNESIUM 324 MG
81 TABLET ORAL DAILY
Qty: 0 | Refills: 0 | Status: DISCONTINUED | OUTPATIENT
Start: 2018-10-11 | End: 2018-10-15

## 2018-10-11 RX ORDER — CARVEDILOL PHOSPHATE 80 MG/1
3.12 CAPSULE, EXTENDED RELEASE ORAL
Qty: 0 | Refills: 0 | Status: DISCONTINUED | OUTPATIENT
Start: 2018-10-11 | End: 2018-10-15

## 2018-10-11 RX ORDER — PANTOPRAZOLE SODIUM 20 MG/1
40 TABLET, DELAYED RELEASE ORAL
Qty: 0 | Refills: 0 | Status: DISCONTINUED | OUTPATIENT
Start: 2018-10-11 | End: 2018-10-15

## 2018-10-11 RX ORDER — SODIUM CHLORIDE 9 MG/ML
1000 INJECTION, SOLUTION INTRAVENOUS
Qty: 0 | Refills: 0 | Status: DISCONTINUED | OUTPATIENT
Start: 2018-10-11 | End: 2018-10-11

## 2018-10-11 RX ORDER — INFLUENZA VIRUS VACCINE 15; 15; 15; 15 UG/.5ML; UG/.5ML; UG/.5ML; UG/.5ML
0.5 SUSPENSION INTRAMUSCULAR ONCE
Qty: 0 | Refills: 0 | Status: COMPLETED | OUTPATIENT
Start: 2018-10-11 | End: 2018-10-15

## 2018-10-11 RX ORDER — TRAMADOL HYDROCHLORIDE 50 MG/1
25 TABLET ORAL EVERY 8 HOURS
Qty: 0 | Refills: 0 | Status: DISCONTINUED | OUTPATIENT
Start: 2018-10-11 | End: 2018-10-15

## 2018-10-11 RX ORDER — FERROUS SULFATE 325(65) MG
325 TABLET ORAL
Qty: 0 | Refills: 0 | Status: DISCONTINUED | OUTPATIENT
Start: 2018-10-11 | End: 2018-10-13

## 2018-10-11 RX ORDER — CHOLECALCIFEROL (VITAMIN D3) 125 MCG
2000 CAPSULE ORAL DAILY
Qty: 0 | Refills: 0 | Status: DISCONTINUED | OUTPATIENT
Start: 2018-10-11 | End: 2018-10-15

## 2018-10-11 RX ORDER — FOLIC ACID 0.8 MG
1 TABLET ORAL DAILY
Qty: 0 | Refills: 0 | Status: DISCONTINUED | OUTPATIENT
Start: 2018-10-11 | End: 2018-10-15

## 2018-10-11 RX ORDER — SERTRALINE 25 MG/1
25 TABLET, FILM COATED ORAL AT BEDTIME
Qty: 0 | Refills: 0 | Status: DISCONTINUED | OUTPATIENT
Start: 2018-10-11 | End: 2018-10-15

## 2018-10-11 RX ORDER — ACETAMINOPHEN 500 MG
1000 TABLET ORAL ONCE
Qty: 0 | Refills: 0 | Status: COMPLETED | OUTPATIENT
Start: 2018-10-11 | End: 2018-10-11

## 2018-10-11 RX ADMIN — Medication 2000 UNIT(S): at 12:33

## 2018-10-11 RX ADMIN — Medication 81 MILLIGRAM(S): at 12:32

## 2018-10-11 RX ADMIN — Medication 650 MILLIGRAM(S): at 00:06

## 2018-10-11 RX ADMIN — Medication 400 MILLIGRAM(S): at 22:30

## 2018-10-11 RX ADMIN — Medication 1000 MILLIGRAM(S): at 23:00

## 2018-10-11 RX ADMIN — PANTOPRAZOLE SODIUM 40 MILLIGRAM(S): 20 TABLET, DELAYED RELEASE ORAL at 06:37

## 2018-10-11 RX ADMIN — SODIUM CHLORIDE 60 MILLILITER(S): 9 INJECTION, SOLUTION INTRAVENOUS at 10:49

## 2018-10-11 RX ADMIN — Medication 650 MILLIGRAM(S): at 07:20

## 2018-10-11 RX ADMIN — Medication 650 MILLIGRAM(S): at 06:37

## 2018-10-11 RX ADMIN — Medication 650 MILLIGRAM(S): at 01:00

## 2018-10-11 RX ADMIN — TRAMADOL HYDROCHLORIDE 25 MILLIGRAM(S): 50 TABLET ORAL at 08:13

## 2018-10-11 NOTE — CONSULT NOTE ADULT - ATTENDING COMMENTS
Patient seen and evaluated  Discussed treatments with patient and son  Surgical vs non-surgical  Risks and outcomes discussed  OR today
Thank you for the courtesy of a consultation, please contact me for any additional questions

## 2018-10-11 NOTE — CONSULT NOTE ADULT - ASSESSMENT
89 yo F with noted PMH including CHB s/p PPM (2010), HFrEF (26% by echo 2016), severe AS, HTN, and HLD who presented to the hospital after a mechanical fall with resulting hip fracture.     1. Preprocedural evaluation:      2. HFrEF: Clinically euvolemic, on carvedilol, PRN furosemide    3. HTN: On carvedilol, adequately controlled    4. HLD: Patient is on atorvastatin  -Since LFTs are increasing, would hold statin and trend LFTs     5. A fib: Patient is not on anticoagulation. Her CHADS2-VASc score is 5 (CHF, HTN, age+2, female), consistent with ~5% annual risk of stroke if off systemic anticoagulation.    ***Note that this is a preliminary note and any recommendations should NOT be carried out until this note is finalized. *** 89 yo F with noted PMH including CHB s/p PPM (2010), HFrEF (26% by echo 2016), moderate-severe AS, HTN, and HLD who presented to the hospital after a mechanical fall with resulting hip fracture.     1. Preprocedural evaluation: Patient's Revised Cardiac Risk Index (RCRI) score is 2 (6.6 % risk) and Thomas score is 3.43% risk. Patient's EF has improved from priors, though she still has moderate-severe AS, which places her at high risk of adverse perioperative cardiac events undergoing intermediate risk surgery. No further cardiac testing is needed prior to patient's surgery.   For patients with severe AS undergoing non-cardiac surgery, suggest continuous (invasive) hemodynamic monitoring; avoid periods of tachycardia, and ensure maintenance of preload and vascular volume. Hypotension should be treated with phenylephrine.     2. HFpEF: Clinically euvolemic, on carvedilol, PRN furosemide    3. HTN: On carvedilol, adequately controlled    4. HLD: Patient is on atorvastatin  -Since LFTs are increasing, would hold statin and trend LFTs     5. A fib: Patient is not on anticoagulation. Her CHADS2-VASc score is 5 (CHF, HTN, age+2, female), consistent with ~5% annual risk of stroke if off systemic anticoagulation.    ***Note that this is a preliminary note and any recommendations should NOT be carried out until this note is finalized. *** 89 yo F with noted PMH including CHB s/p PPM (2010), moderate-severe AS, HTN, and HLD who presented to the hospital after a mechanical fall with resulting hip fracture.     1. Preprocedural evaluation: Patient's Revised Cardiac Risk Index (RCRI) score is 2 (6.6 % risk) and Thomas score is 3.43% risk. Patient's EF has improved from priors, though she still has moderate-severe AS, which places her at intermediate to high risk of adverse perioperative cardiac events undergoing intermediate risk surgery. No further cardiac testing is needed prior to patient's surgery.   For patients with AS undergoing non-cardiac surgery, suggest close hemodynamic monitoring; avoid periods of tachycardia, and ensure maintenance of preload and vascular volume. Hypotension should be treated with phenylephrine.     2. HFpEF: Clinically euvolemic, on carvedilol, PRN furosemide    3. HTN: On carvedilol, adequately controlled    4. HLD: Patient is on atorvastatin  -Since LFTs are increasing, would hold statin and trend LFTs     5. A fib: Patient is not on anticoagulation. Her CHADS2-VASc score is 5 (CHF, HTN, age+2, female), consistent with ~5% annual risk of stroke if off systemic anticoagulation.    ***Note that this is a preliminary note and any recommendations should NOT be carried out until this note is finalized. *** 89 yo F with noted PMH including CHB s/p PPM (2010), moderate-severe AS, HTN, and HLD who presented to the hospital after a mechanical fall with resulting hip fracture.     1. Preprocedural evaluation: Patient's Revised Cardiac Risk Index (RCRI) score is 2 (6.6 % risk) and Thomas score is 3.43% risk. Patient's EF has improved from priors, though she still has moderate-severe AS, which places her at intermediate to high risk of adverse perioperative cardiac events undergoing intermediate risk surgery. No further cardiac testing is needed prior to patient's surgery.   For patients with AS undergoing non-cardiac surgery, suggest close hemodynamic monitoring; avoid periods of tachycardia, and ensure maintenance of preload and vascular volume. Hypotension should be treated with phenylephrine.     2. HFpEF: Clinically euvolemic, on carvedilol, PRN furosemide    3. HTN: On carvedilol, adequately controlled    4. HLD: Patient is on atorvastatin  -Since LFTs are increasing, would hold statin and trend LFTs     5. A fib: Patient is not on anticoagulation. Her CHADS2-VASc score is 5 (CHF, HTN, age+2, female), consistent with ~5% annual risk of stroke if off systemic anticoagulation.  -should discuss with her outpatient cardiologist regarding risks/benefits of systemic AC

## 2018-10-11 NOTE — CONSULT NOTE ADULT - SUBJECTIVE AND OBJECTIVE BOX
CHIEF COMPLAINT: Hip pain    HPI: 89 yo F with pAF, CHB s/p PPM (2010), HTN, HLD who presented to the hospital after a fall. Patient       Her PPM was last checked on    Cardiologist: Steven Goldberg    PAST MEDICAL & SURGICAL HISTORY:  As above, also Thyroid nodule, Status post pericardiocentesis: 5/18/2016, Elective surgery: excision of thyroid nodule    Allergies    No Known Allergies    MEDICATIONS  (STANDING):  acetaminophen   Tablet .. 650 milliGRAM(s) Oral every 6 hours  aspirin enteric coated 81 milliGRAM(s) Oral daily  atorvastatin 20 milliGRAM(s) Oral at bedtime  carvedilol 6.25 milliGRAM(s) Oral <User Schedule>  carvedilol 3.125 milliGRAM(s) Oral <User Schedule>  cholecalciferol 2000 Unit(s) Oral daily  influenza   Vaccine 0.5 milliLiter(s) IntraMuscular once  pantoprazole    Tablet 40 milliGRAM(s) Oral before breakfast  sertraline 25 milliGRAM(s) Oral at bedtime    MEDICATIONS  (PRN):  HYDROmorphone  Injectable 0.25 milliGRAM(s) IV Push every 6 hours PRN Severe Pain (7 - 10)  traMADol 25 milliGRAM(s) Oral every 8 hours PRN Moderate Pain (4 - 6)    FAMILY HISTORY:  No pertinent family history in first degree relatives    No family history of premature coronary artery disease or sudden cardiac death    SOCIAL HISTORY:  Smoking-  Alcohol-  Illicit Drug use-    REVIEW OF SYSTEMS:  Constitutional: [ ] fever, [ ]weight loss,  [ ]fatigue  Eyes: [ ] visual changes  Respiratory: [ ]shortness of breath;  [ ] cough, [ ]wheezing, [ ]chills, [ ]hemoptysis  Cardiovascular: [ ] chest pain, [ ]palpitations, [ ]dizziness,  [ ]leg swelling [ ]syncope  Gastrointestinal: [ ] abdominal pain, [ ]nausea, [ ]vomiting,  [ ]diarrhea   Genitourinary: [ ] dysuria, [ ] hematuria  Neurologic: [ ] headaches [ ] tremors  [ ] weakness [ ] lightheadedness  Skin: [ ] itching, [ ]burning, [ ] rashes  Endocrine: [ ] heat or cold intolerance  Musculoskeletal: [ ] joint pain or swelling; [ ] muscle, back, or extremity pain  Psychiatric: [ ] depression, [ ]anxiety, [ ]mood swings, or [ ]difficulty sleeping  Hematologic: [ ] easy bruising, [ ] bleeding gums       [ x] All others negative	  [ ] Unable to obtain    Vital Signs Last 24 Hrs  T(C): 36.4 (11 Oct 2018 04:53), Max: 37.2 (10 Oct 2018 19:04)  T(F): 97.6 (11 Oct 2018 04:53), Max: 99 (10 Oct 2018 19:04)  HR: 60 (11 Oct 2018 04:53) (60 - 74)  BP: 106/54 (11 Oct 2018 04:53) (106/54 - 137/54)  BP(mean): --  RR: 18 (11 Oct 2018 04:53) (17 - 18)  SpO2: 95% (11 Oct 2018 04:53) (94% - 98%)  I&O's Summary      PHYSICAL EXAM:  General: No acute distress  HEENT: EOMI, PERRL  Neck: Supple, No JVD  Lungs: Clear to auscultation bilaterally; No rales or wheezing  Heart: Regular rate and rhythm; No murmurs, rubs, or gallops  Abdomen: Nontender, bowel sounds present  Extremities: No clubbing, cyanosis, or edema  Nervous system:  Alert & Oriented X3, no focal deficits  Psychiatric: Normal affect  Skin: No rashes or lesions      LABS:  10-11    137  |  104  |  76<H>  ----------------------------<  147<H>  5.1   |  21<L>  |  2.48<H>    Ca    8.5      11 Oct 2018 06:35  Phos  5.0     10-11  Mg     2.7     10-11    TPro  6.8  /  Alb  3.1<L>  /  TBili  0.6  /  DBili  x   /  AST  125<H>  /  ALT  74<H>  /  AlkPhos  67  10-11    Creatinine Trend: 2.48<--, 2.04<--                        11.0   9.6   )-----------( 216      ( 11 Oct 2018 06:35 )             33.7     PT/INR - ( 10 Oct 2018 18:08 )   PT: 11.4 sec;   INR: 1.04 ratio         PTT - ( 10 Oct 2018 18:08 )  PTT:33.7 sec    Lipid Panel: Cholesterol, Serum 342  Direct   HDL Cholesterol, Serum 30  Triglycerides, Serum 378    TSH 7.93    RADIOLOGY: < from: Xray Chest 1 View- PORTABLE-Urgent (10.10.18 @ 17:22) >  A frontal chest film demonstrates emphysematous and fibrotic changes in   both lungs.    The heart is enlarged.      Pacemaker in situ .     < end of copied text >      ECG [my interpretation]:    TELEMETRY:    ECHO: < from: Transthoracic Echocardiogram (05.26.16 @ 15:27) >  Conclusions:  1. Mitral annular calcification, otherwise normal mitral  valve. Moderate mitral regurgitation.  2. Calcified trileaflet aortic valve with decreased  opening. Peak transaortic valve gradient equals 18 mm Hg,  mean transaortic valve gradient equals 19 mm Hg, estimated  aortic valve area equals 0.9 sqcm (by continuity equation),  consistent with severe aortic stenosis. Reduce gradients  secondary to decreased left ventricular function/decreased  flow. Consider MANSI for further evaluation of aortic valve  if clinically indicated.  3. Severe global left ventricular systolic dysfunction.  4. Normal right ventricular size with decreased right  ventricular systolic function. A device wire is noted in  the right heart.  5. Estimated pulmonary artery systolic pressure equals 49  mm Hg, assuming right atrial pressure equals 8 mm Hg,  consistent with mild pulmonary pressures.  6. Normal pericardium with no pericardial effusion.  7. Left pleural effusion.  *** Compared with echocardiogram of 5/20/2016, aortic  stenosis is better evaluated and appears to be severe.    < end of copied text >    STRESS TEST: < from: Nuclear Stress Test-Pharmacologic (05.19.16 @ 15:09) >  IMPRESSIONS:Abnormal Study  * The left ventricle was enlarged. There is a  medium-sized, mild defect in the mid to distal  anteroseptal wall that is reversible suggestive of  ischemia.  There is a small, moderate defect in the apex  that is mostly fixed suggestive of infarct with mild  ananth-infarct ischemia.  There is a small, mild defect in  the basal to mid inferoseptal walls that is partially  reversible suggestive of mild ischemia with a small area  of scar in the basal inferoseptum.  * Post-stress gated wall motion analysis was performed  (LVEF = 26 %;LVEDV = 166 ml.) revealing globally reduced  left ventricular systolic function with paradoxical septal  motion consistent with a paced rhythm.  *** No previous Nuclear/Stress exam.    < end of copied text > CHIEF COMPLAINT: Hip pain    HPI: 89 yo F with pAF, CHB s/p PPM (2010), HTN, HLD who presented to the hospital after a fall. Patient reports she was getting up from a table when her foot got caught between the table leg and a walker. The patient lost her balance and tried to grab on to the table, but was unable to and fell on her side. She denies any chest pain, palpitations, LH, or syncope either preceding or following the fall. Currently denies CP or dyspnea, has some positional LE pain.     Her PPM was last checked < 6 months ago per patient.     Cardiologist: Steven Goldberg    PAST MEDICAL & SURGICAL HISTORY:  As above, also Thyroid nodule, Status post pericardiocentesis: 5/18/2016, Elective surgery: excision of thyroid nodule    Allergies    No Known Allergies    MEDICATIONS  (STANDING):  acetaminophen   Tablet .. 650 milliGRAM(s) Oral every 6 hours  aspirin enteric coated 81 milliGRAM(s) Oral daily  atorvastatin 20 milliGRAM(s) Oral at bedtime  carvedilol 6.25 milliGRAM(s) Oral <User Schedule>  carvedilol 3.125 milliGRAM(s) Oral <User Schedule>  cholecalciferol 2000 Unit(s) Oral daily  influenza   Vaccine 0.5 milliLiter(s) IntraMuscular once  pantoprazole    Tablet 40 milliGRAM(s) Oral before breakfast  sertraline 25 milliGRAM(s) Oral at bedtime    MEDICATIONS  (PRN):  HYDROmorphone  Injectable 0.25 milliGRAM(s) IV Push every 6 hours PRN Severe Pain (7 - 10)  traMADol 25 milliGRAM(s) Oral every 8 hours PRN Moderate Pain (4 - 6)    FAMILY HISTORY:  No pertinent family history in first degree relatives    No family history of premature coronary artery disease or sudden cardiac death    SOCIAL HISTORY:  Smoking-  Alcohol-  Illicit Drug use-    REVIEW OF SYSTEMS:  Constitutional: [ ] fever, [ ]weight loss,  [ ]fatigue  Eyes: [ ] visual changes  Respiratory: [ ]shortness of breath;  [ ] cough, [ ]wheezing, [ ]chills, [ ]hemoptysis  Cardiovascular: [ ] chest pain, [ ]palpitations, [ ]dizziness,  [ ]leg swelling [ ]syncope  Gastrointestinal: [ ] abdominal pain, [ ]nausea, [ ]vomiting,  [ ]diarrhea   Genitourinary: [ ] dysuria, [ ] hematuria  Neurologic: [ ] headaches [ ] tremors  [ ] weakness [ ] lightheadedness  Skin: [ ] itching, [ ]burning, [ ] rashes  Endocrine: [ ] heat or cold intolerance  Musculoskeletal: [ ] joint pain or swelling; [ ] muscle, back, or extremity pain  Psychiatric: [ ] depression, [ ]anxiety, [ ]mood swings, or [ ]difficulty sleeping  Hematologic: [ ] easy bruising, [ ] bleeding gums       [ x] All others negative	  [ ] Unable to obtain    Vital Signs Last 24 Hrs  T(C): 36.4 (11 Oct 2018 04:53), Max: 37.2 (10 Oct 2018 19:04)  T(F): 97.6 (11 Oct 2018 04:53), Max: 99 (10 Oct 2018 19:04)  HR: 60 (11 Oct 2018 04:53) (60 - 74)  BP: 106/54 (11 Oct 2018 04:53) (106/54 - 137/54)  BP(mean): --  RR: 18 (11 Oct 2018 04:53) (17 - 18)  SpO2: 95% (11 Oct 2018 04:53) (94% - 98%)  I&O's Summary      PHYSICAL EXAM:  General: No acute distress  HEENT: EOMI, PERRL  Neck: Supple, No JVD  Lungs: Clear to auscultation bilaterally; No rales or wheezing  Heart: Regular rate and rhythm; No murmurs, rubs, or gallops  Abdomen: Nontender, bowel sounds present  Extremities: No clubbing, cyanosis, or edema  Nervous system:  Alert & Oriented X3, no focal deficits  Psychiatric: Normal affect  Skin: No rashes or lesions      LABS:  10-11    137  |  104  |  76<H>  ----------------------------<  147<H>  5.1   |  21<L>  |  2.48<H>    Ca    8.5      11 Oct 2018 06:35  Phos  5.0     10-11  Mg     2.7     10-11    TPro  6.8  /  Alb  3.1<L>  /  TBili  0.6  /  DBili  x   /  AST  125<H>  /  ALT  74<H>  /  AlkPhos  67  10-11    Creatinine Trend: 2.48<--, 2.04<--                        11.0   9.6   )-----------( 216      ( 11 Oct 2018 06:35 )             33.7     PT/INR - ( 10 Oct 2018 18:08 )   PT: 11.4 sec;   INR: 1.04 ratio         PTT - ( 10 Oct 2018 18:08 )  PTT:33.7 sec    Lipid Panel: Cholesterol, Serum 342  Direct   HDL Cholesterol, Serum 30  Triglycerides, Serum 378    TSH 7.93    RADIOLOGY: < from: Xray Chest 1 View- PORTABLE-Urgent (10.10.18 @ 17:22) >  A frontal chest film demonstrates emphysematous and fibrotic changes in   both lungs.    The heart is enlarged.      Pacemaker in situ .     < end of copied text >      ECG [my interpretation]:    TELEMETRY:    ECHO: < from: Transthoracic Echocardiogram (05.26.16 @ 15:27) >  Conclusions:  1. Mitral annular calcification, otherwise normal mitral  valve. Moderate mitral regurgitation.  2. Calcified trileaflet aortic valve with decreased  opening. Peak transaortic valve gradient equals 18 mm Hg,  mean transaortic valve gradient equals 19 mm Hg, estimated  aortic valve area equals 0.9 sqcm (by continuity equation),  consistent with severe aortic stenosis. Reduce gradients  secondary to decreased left ventricular function/decreased  flow. Consider MANSI for further evaluation of aortic valve  if clinically indicated.  3. Severe global left ventricular systolic dysfunction.  4. Normal right ventricular size with decreased right  ventricular systolic function. A device wire is noted in  the right heart.  5. Estimated pulmonary artery systolic pressure equals 49  mm Hg, assuming right atrial pressure equals 8 mm Hg,  consistent with mild pulmonary pressures.  6. Normal pericardium with no pericardial effusion.  7. Left pleural effusion.  *** Compared with echocardiogram of 5/20/2016, aortic  stenosis is better evaluated and appears to be severe.    < end of copied text >    STRESS TEST: < from: Nuclear Stress Test-Pharmacologic (05.19.16 @ 15:09) >  IMPRESSIONS:Abnormal Study  * The left ventricle was enlarged. There is a  medium-sized, mild defect in the mid to distal  anteroseptal wall that is reversible suggestive of  ischemia.  There is a small, moderate defect in the apex  that is mostly fixed suggestive of infarct with mild  ananth-infarct ischemia.  There is a small, mild defect in  the basal to mid inferoseptal walls that is partially  reversible suggestive of mild ischemia with a small area  of scar in the basal inferoseptum.  * Post-stress gated wall motion analysis was performed  (LVEF = 26 %;LVEDV = 166 ml.) revealing globally reduced  left ventricular systolic function with paradoxical septal  motion consistent with a paced rhythm.  *** No previous Nuclear/Stress exam.    < end of copied text > CHIEF COMPLAINT: Hip pain    HPI: 89 yo F with pAF, CHB s/p PPM (2010), HTN, HLD who presented to the hospital after a fall. Patient reports she was getting up from a table when her foot got caught between the table leg and a walker. The patient lost her balance and tried to grab on to the table, but was unable to and fell on her side. She denies any chest pain, palpitations, LH, or syncope either preceding or following the fall. Currently denies CP or dyspnea, has some positional LE pain.     Her PPM was last checked < 6 months ago per patient.     Cardiologist: Steven Goldberg    PAST MEDICAL & SURGICAL HISTORY:  As above, also Thyroid nodule, Status post pericardiocentesis: 5/18/2016, Elective surgery: excision of thyroid nodule    Allergies    No Known Allergies    MEDICATIONS  (STANDING):  acetaminophen   Tablet .. 650 milliGRAM(s) Oral every 6 hours  aspirin enteric coated 81 milliGRAM(s) Oral daily  atorvastatin 20 milliGRAM(s) Oral at bedtime  carvedilol 6.25 milliGRAM(s) Oral <User Schedule>  carvedilol 3.125 milliGRAM(s) Oral <User Schedule>  cholecalciferol 2000 Unit(s) Oral daily  influenza   Vaccine 0.5 milliLiter(s) IntraMuscular once  pantoprazole    Tablet 40 milliGRAM(s) Oral before breakfast  sertraline 25 milliGRAM(s) Oral at bedtime    MEDICATIONS  (PRN):  HYDROmorphone  Injectable 0.25 milliGRAM(s) IV Push every 6 hours PRN Severe Pain (7 - 10)  traMADol 25 milliGRAM(s) Oral every 8 hours PRN Moderate Pain (4 - 6)    FAMILY HISTORY:  No pertinent family history in first degree relatives    No family history of premature coronary artery disease or sudden cardiac death    SOCIAL HISTORY:  Smoking-  Alcohol-  Illicit Drug use-    REVIEW OF SYSTEMS:  Constitutional: [ ] fever, [ ]weight loss,  [ ]fatigue  Eyes: [ ] visual changes  Respiratory: [ ]shortness of breath;  [ ] cough, [ ]wheezing, [ ]chills, [ ]hemoptysis  Cardiovascular: [ ] chest pain, [ ]palpitations, [ ]dizziness,  [ ]leg swelling [ ]syncope  Gastrointestinal: [ ] abdominal pain, [ ]nausea, [ ]vomiting,  [ ]diarrhea   Genitourinary: [ ] dysuria, [ ] hematuria  Neurologic: [ ] headaches [ ] tremors  [ ] weakness [ ] lightheadedness  Skin: [ ] itching, [ ]burning, [ ] rashes  Endocrine: [ ] heat or cold intolerance  Musculoskeletal: [ ] joint pain or swelling; [ ] muscle, back, or extremity pain  Psychiatric: [ ] depression, [ ]anxiety, [ ]mood swings, or [ ]difficulty sleeping  Hematologic: [ ] easy bruising, [ ] bleeding gums       [ x] All others negative	  [ ] Unable to obtain    Vital Signs Last 24 Hrs  T(C): 36.4 (11 Oct 2018 04:53), Max: 37.2 (10 Oct 2018 19:04)  T(F): 97.6 (11 Oct 2018 04:53), Max: 99 (10 Oct 2018 19:04)  HR: 60 (11 Oct 2018 04:53) (60 - 74)  BP: 106/54 (11 Oct 2018 04:53) (106/54 - 137/54)  BP(mean): --  RR: 18 (11 Oct 2018 04:53) (17 - 18)  SpO2: 95% (11 Oct 2018 04:53) (94% - 98%)  I&O's Summary      PHYSICAL EXAM:  General: No acute distress  HEENT: EOMI, PERRL  Neck: Supple, No JVD  Lungs: Clear to auscultation bilaterally; No rales or wheezing  Heart: Regular rate and rhythm; No murmurs, rubs, or gallops  Abdomen: Nontender, bowel sounds present  Extremities: No clubbing, cyanosis, or edema  Nervous system:  Alert & Oriented X3, no focal deficits  Psychiatric: Normal affect  Skin: No rashes or lesions      LABS:  10-11    137  |  104  |  76<H>  ----------------------------<  147<H>  5.1   |  21<L>  |  2.48<H>    Ca    8.5      11 Oct 2018 06:35  Phos  5.0     10-11  Mg     2.7     10-11    TPro  6.8  /  Alb  3.1<L>  /  TBili  0.6  /  DBili  x   /  AST  125<H>  /  ALT  74<H>  /  AlkPhos  67  10-11    Creatinine Trend: 2.48<--, 2.04<--                        11.0   9.6   )-----------( 216      ( 11 Oct 2018 06:35 )             33.7     PT/INR - ( 10 Oct 2018 18:08 )   PT: 11.4 sec;   INR: 1.04 ratio         PTT - ( 10 Oct 2018 18:08 )  PTT:33.7 sec    Lipid Panel: Cholesterol, Serum 342  Direct   HDL Cholesterol, Serum 30  Triglycerides, Serum 378    TSH 7.93    RADIOLOGY: < from: Xray Chest 1 View- PORTABLE-Urgent (10.10.18 @ 17:22) >  A frontal chest film demonstrates emphysematous and fibrotic changes in   both lungs.    The heart is enlarged.      Pacemaker in situ .     < end of copied text >      ECG [my interpretation]:    TELEMETRY:    ECHO: Review of preliminary images shows grossly mildly decreased EF, with likely moderate-severe AS (Vmax 3.24m/s, mean gradient 25 mmHg, DI 0.27) CHIEF COMPLAINT: Hip pain    HPI: 89 yo F with pAF, CHB s/p PPM (2010), HTN, HLD who presented to the hospital after a fall. Patient reports she was getting up from a table when her foot got caught between the table leg and a walker. The patient lost her balance and tried to grab on to the table, but was unable to and fell on her side. She denies any chest pain, palpitations, LH, or syncope either preceding or following the fall. Currently denies CP or dyspnea, has some positional LE pain. At home she ambulates with walker; has dyspnea after > 0.5-1 blocks, which has been stable for several years. Otherwise no exertional chest pain or palpitations. No orthopnea or PND.     Her PPM was last checked < 6 months ago per patient.     Cardiologist: Steven Goldberg    PAST MEDICAL & SURGICAL HISTORY:  As above, also Thyroid nodule, Status post pericardiocentesis: 5/18/2016, Elective surgery: excision of thyroid nodule    Allergies    No Known Allergies    MEDICATIONS  (STANDING):  acetaminophen   Tablet .. 650 milliGRAM(s) Oral every 6 hours  aspirin enteric coated 81 milliGRAM(s) Oral daily  atorvastatin 20 milliGRAM(s) Oral at bedtime  carvedilol 6.25 milliGRAM(s) Oral <User Schedule>  carvedilol 3.125 milliGRAM(s) Oral <User Schedule>  cholecalciferol 2000 Unit(s) Oral daily  influenza   Vaccine 0.5 milliLiter(s) IntraMuscular once  pantoprazole    Tablet 40 milliGRAM(s) Oral before breakfast  sertraline 25 milliGRAM(s) Oral at bedtime    MEDICATIONS  (PRN):  HYDROmorphone  Injectable 0.25 milliGRAM(s) IV Push every 6 hours PRN Severe Pain (7 - 10)  traMADol 25 milliGRAM(s) Oral every 8 hours PRN Moderate Pain (4 - 6)    FAMILY HISTORY:  No family history of premature coronary artery disease or sudden cardiac death    SOCIAL HISTORY:  Smoking-Denies  Alcohol-Denies  Illicit Drug use-Denies    REVIEW OF SYSTEMS:  Constitutional: [ ] fever, [ ]weight loss,  [ ]fatigue  Eyes: [ ] visual changes  Respiratory: [ ]shortness of breath;  [ ] cough, [ ]wheezing, [ ]chills, [ ]hemoptysis  Cardiovascular: [ ] chest pain, [ ]palpitations, [ ]dizziness,  [ ]leg swelling [ ]syncope  Gastrointestinal: [ ] abdominal pain, [ ]nausea, [ ]vomiting,  [ ]diarrhea   Genitourinary: [ ] dysuria, [ ] hematuria  Neurologic: [ ] headaches [ ] tremors  [ ] weakness [ ] lightheadedness  Skin: [ ] itching, [ ]burning, [ ] rashes  Endocrine: [ ] heat or cold intolerance  Musculoskeletal: [ ] joint pain or swelling; [x ] muscle, back, or extremity pain  Psychiatric: [ ] depression, [ ]anxiety, [ ]mood swings, or [ ]difficulty sleeping  Hematologic: [ ] easy bruising, [ ] bleeding gums       [ x] All others negative	  [ ] Unable to obtain    Vital Signs Last 24 Hrs  T(C): 36.4 (11 Oct 2018 04:53), Max: 37.2 (10 Oct 2018 19:04)  T(F): 97.6 (11 Oct 2018 04:53), Max: 99 (10 Oct 2018 19:04)  HR: 60 (11 Oct 2018 04:53) (60 - 74)  BP: 106/54 (11 Oct 2018 04:53) (106/54 - 137/54)  BP(mean): --  RR: 18 (11 Oct 2018 04:53) (17 - 18)  SpO2: 95% (11 Oct 2018 04:53) (94% - 98%)  I&O's Summary      PHYSICAL EXAM:  General: No acute distress  HEENT: EOMI, PERRL  Neck: Supple, No JVD  Lungs: Clear to auscultation bilaterally; No rales or wheezing  Heart: Regular rate and rhythm; III/VI OUMAR at RUSB  Abdomen: Nontender, bowel sounds present  Extremities: No clubbing, cyanosis, or edema  Nervous system:  Alert & Oriented X3, no focal deficits  Psychiatric: Normal affect  Skin: No rashes or lesions      LABS:  10-11    137  |  104  |  76<H>  ----------------------------<  147<H>  5.1   |  21<L>  |  2.48<H>    Ca    8.5      11 Oct 2018 06:35  Phos  5.0     10-11  Mg     2.7     10-11    TPro  6.8  /  Alb  3.1<L>  /  TBili  0.6  /  DBili  x   /  AST  125<H>  /  ALT  74<H>  /  AlkPhos  67  10-11    Creatinine Trend: 2.48<--, 2.04<--                        11.0   9.6   )-----------( 216      ( 11 Oct 2018 06:35 )             33.7     PT/INR - ( 10 Oct 2018 18:08 )   PT: 11.4 sec;   INR: 1.04 ratio         PTT - ( 10 Oct 2018 18:08 )  PTT:33.7 sec    Lipid Panel: Cholesterol, Serum 342  Direct   HDL Cholesterol, Serum 30  Triglycerides, Serum 378    TSH 7.93    RADIOLOGY: < from: Xray Chest 1 View- PORTABLE-Urgent (10.10.18 @ 17:22) >  A frontal chest film demonstrates emphysematous and fibrotic changes in   both lungs.    The heart is enlarged.      Pacemaker in situ .     < end of copied text >      ECG [my interpretation]: None in chart [please obtain one]    ECHO: Review of preliminary images shows grossly mildly decreased EF, with likely moderate-severe AS (Vmax 3.24m/s, mean gradient 25 mmHg, DI 0.27)

## 2018-10-12 LAB
ANION GAP SERPL CALC-SCNC: 11 MMOL/L — SIGNIFICANT CHANGE UP (ref 5–17)
BUN SERPL-MCNC: 63 MG/DL — HIGH (ref 7–18)
CALCIUM SERPL-MCNC: 7.2 MG/DL — LOW (ref 8.4–10.5)
CHLORIDE SERPL-SCNC: 111 MMOL/L — HIGH (ref 96–108)
CO2 SERPL-SCNC: 19 MMOL/L — LOW (ref 22–31)
CREAT SERPL-MCNC: 1.97 MG/DL — HIGH (ref 0.5–1.3)
GLUCOSE SERPL-MCNC: 115 MG/DL — HIGH (ref 70–99)
HCT VFR BLD CALC: 24.9 % — LOW (ref 34.5–45)
HGB BLD-MCNC: 7.9 G/DL — LOW (ref 11.5–15.5)
MCHC RBC-ENTMCNC: 30.1 PG — SIGNIFICANT CHANGE UP (ref 27–34)
MCHC RBC-ENTMCNC: 31.8 GM/DL — LOW (ref 32–36)
MCV RBC AUTO: 94.5 FL — SIGNIFICANT CHANGE UP (ref 80–100)
PLATELET # BLD AUTO: 147 K/UL — LOW (ref 150–400)
POTASSIUM SERPL-MCNC: 3.9 MMOL/L — SIGNIFICANT CHANGE UP (ref 3.5–5.3)
POTASSIUM SERPL-SCNC: 3.9 MMOL/L — SIGNIFICANT CHANGE UP (ref 3.5–5.3)
RBC # BLD: 2.64 M/UL — LOW (ref 3.8–5.2)
RBC # FLD: 14.3 % — SIGNIFICANT CHANGE UP (ref 10.3–14.5)
SODIUM SERPL-SCNC: 141 MMOL/L — SIGNIFICANT CHANGE UP (ref 135–145)
T3FREE SERPL-MCNC: 1.9 PG/ML — SIGNIFICANT CHANGE UP (ref 1.8–4.6)
T4 FREE SERPL-MCNC: 1 NG/DL — SIGNIFICANT CHANGE UP (ref 0.9–1.8)
WBC # BLD: 7.9 K/UL — SIGNIFICANT CHANGE UP (ref 3.8–10.5)
WBC # FLD AUTO: 7.9 K/UL — SIGNIFICANT CHANGE UP (ref 3.8–10.5)

## 2018-10-12 PROCEDURE — 71250 CT THORAX DX C-: CPT | Mod: 26

## 2018-10-12 PROCEDURE — 99233 SBSQ HOSP IP/OBS HIGH 50: CPT | Mod: GC

## 2018-10-12 RX ORDER — OMEGA-3 ACID ETHYL ESTERS 1 G
2 CAPSULE ORAL
Qty: 0 | Refills: 0 | Status: DISCONTINUED | OUTPATIENT
Start: 2018-10-12 | End: 2018-10-15

## 2018-10-12 RX ORDER — ACETAMINOPHEN 500 MG
650 TABLET ORAL EVERY 6 HOURS
Qty: 0 | Refills: 0 | Status: DISCONTINUED | OUTPATIENT
Start: 2018-10-12 | End: 2018-10-15

## 2018-10-12 RX ADMIN — Medication 650 MILLIGRAM(S): at 18:52

## 2018-10-12 RX ADMIN — SODIUM CHLORIDE 100 MILLILITER(S): 9 INJECTION INTRAMUSCULAR; INTRAVENOUS; SUBCUTANEOUS at 02:39

## 2018-10-12 RX ADMIN — Medication 100 MILLIGRAM(S): at 21:30

## 2018-10-12 RX ADMIN — Medication 100 MILLIGRAM(S): at 13:17

## 2018-10-12 RX ADMIN — ENOXAPARIN SODIUM 40 MILLIGRAM(S): 100 INJECTION SUBCUTANEOUS at 05:46

## 2018-10-12 RX ADMIN — Medication 500 MILLIGRAM(S): at 17:36

## 2018-10-12 RX ADMIN — PANTOPRAZOLE SODIUM 40 MILLIGRAM(S): 20 TABLET, DELAYED RELEASE ORAL at 11:44

## 2018-10-12 RX ADMIN — TRAMADOL HYDROCHLORIDE 25 MILLIGRAM(S): 50 TABLET ORAL at 08:40

## 2018-10-12 RX ADMIN — Medication 325 MILLIGRAM(S): at 11:46

## 2018-10-12 RX ADMIN — CARVEDILOL PHOSPHATE 6.25 MILLIGRAM(S): 80 CAPSULE, EXTENDED RELEASE ORAL at 21:30

## 2018-10-12 RX ADMIN — Medication 500 MILLIGRAM(S): at 05:47

## 2018-10-12 RX ADMIN — Medication 100 MILLIGRAM(S): at 05:47

## 2018-10-12 RX ADMIN — CARVEDILOL PHOSPHATE 3.12 MILLIGRAM(S): 80 CAPSULE, EXTENDED RELEASE ORAL at 11:44

## 2018-10-12 RX ADMIN — TRAMADOL HYDROCHLORIDE 25 MILLIGRAM(S): 50 TABLET ORAL at 09:30

## 2018-10-12 RX ADMIN — Medication 325 MILLIGRAM(S): at 17:36

## 2018-10-12 RX ADMIN — SERTRALINE 25 MILLIGRAM(S): 25 TABLET, FILM COATED ORAL at 21:30

## 2018-10-12 RX ADMIN — Medication 2000 UNIT(S): at 11:46

## 2018-10-12 RX ADMIN — Medication 325 MILLIGRAM(S): at 08:40

## 2018-10-12 RX ADMIN — Medication 1 MILLIGRAM(S): at 11:45

## 2018-10-12 RX ADMIN — Medication 81 MILLIGRAM(S): at 11:45

## 2018-10-12 NOTE — PROGRESS NOTE ADULT - PROBLEM SELECTOR PLAN 3
lipitor 20mg qd will dc atorvastatin in light of transaminitis and start Lovaza  f.u CBC and CMP tomorrow.

## 2018-10-12 NOTE — PROGRESS NOTE ADULT - ATTENDING COMMENTS
Patient seen and evaluated  Chart reviewed  f/u CBC  DVT prophylaxis  PT
Patient seen and examined at bedside, pending orthopedic surgical nailing for her right intertrochanteric fracture.   physical exam pertinent for an elderly lady with right leg external rotated and painful.  A/P  1- Right intertrochanteric fracture:   Ortho input appreciated  DVT prophylaxis after surgery  Pain control   cardio consult appreciated.   rest as above

## 2018-10-13 DIAGNOSIS — R91.8 OTHER NONSPECIFIC ABNORMAL FINDING OF LUNG FIELD: ICD-10-CM

## 2018-10-13 DIAGNOSIS — R74.0 NONSPECIFIC ELEVATION OF LEVELS OF TRANSAMINASE AND LACTIC ACID DEHYDROGENASE [LDH]: ICD-10-CM

## 2018-10-13 DIAGNOSIS — D69.6 THROMBOCYTOPENIA, UNSPECIFIED: ICD-10-CM

## 2018-10-13 DIAGNOSIS — E04.1 NONTOXIC SINGLE THYROID NODULE: ICD-10-CM

## 2018-10-13 DIAGNOSIS — D50.0 IRON DEFICIENCY ANEMIA SECONDARY TO BLOOD LOSS (CHRONIC): ICD-10-CM

## 2018-10-13 LAB
ALBUMIN SERPL ELPH-MCNC: 2.1 G/DL — LOW (ref 3.5–5)
ALP SERPL-CCNC: 53 U/L — SIGNIFICANT CHANGE UP (ref 40–120)
ALT FLD-CCNC: 16 U/L DA — SIGNIFICANT CHANGE UP (ref 10–60)
ANION GAP SERPL CALC-SCNC: 9 MMOL/L — SIGNIFICANT CHANGE UP (ref 5–17)
AST SERPL-CCNC: 26 U/L — SIGNIFICANT CHANGE UP (ref 10–40)
BILIRUB SERPL-MCNC: 0.6 MG/DL — SIGNIFICANT CHANGE UP (ref 0.2–1.2)
BUN SERPL-MCNC: 48 MG/DL — HIGH (ref 7–18)
CALCIUM SERPL-MCNC: 7.7 MG/DL — LOW (ref 8.4–10.5)
CHLORIDE SERPL-SCNC: 112 MMOL/L — HIGH (ref 96–108)
CO2 SERPL-SCNC: 20 MMOL/L — LOW (ref 22–31)
CREAT SERPL-MCNC: 1.59 MG/DL — HIGH (ref 0.5–1.3)
GLUCOSE SERPL-MCNC: 114 MG/DL — HIGH (ref 70–99)
HCT VFR BLD CALC: 25 % — LOW (ref 34.5–45)
HGB BLD-MCNC: 8.4 G/DL — LOW (ref 11.5–15.5)
IRON SATN MFR SERPL: 16 UG/DL — LOW (ref 40–150)
IRON SATN MFR SERPL: 7 % — LOW (ref 15–50)
MCHC RBC-ENTMCNC: 31 PG — SIGNIFICANT CHANGE UP (ref 27–34)
MCHC RBC-ENTMCNC: 33.4 GM/DL — SIGNIFICANT CHANGE UP (ref 32–36)
MCV RBC AUTO: 92.8 FL — SIGNIFICANT CHANGE UP (ref 80–100)
PLATELET # BLD AUTO: 133 K/UL — LOW (ref 150–400)
POTASSIUM SERPL-MCNC: 4.2 MMOL/L — SIGNIFICANT CHANGE UP (ref 3.5–5.3)
POTASSIUM SERPL-SCNC: 4.2 MMOL/L — SIGNIFICANT CHANGE UP (ref 3.5–5.3)
PROT SERPL-MCNC: 5.3 G/DL — LOW (ref 6–8.3)
RBC # BLD: 2.69 M/UL — LOW (ref 3.8–5.2)
RBC # FLD: 14.6 % — HIGH (ref 10.3–14.5)
SODIUM SERPL-SCNC: 141 MMOL/L — SIGNIFICANT CHANGE UP (ref 135–145)
TIBC SERPL-MCNC: 235 UG/DL — LOW (ref 250–450)
UIBC SERPL-MCNC: 219 UG/DL — SIGNIFICANT CHANGE UP (ref 110–370)
WBC # BLD: 10.2 K/UL — SIGNIFICANT CHANGE UP (ref 3.8–10.5)
WBC # FLD AUTO: 10.2 K/UL — SIGNIFICANT CHANGE UP (ref 3.8–10.5)

## 2018-10-13 PROCEDURE — 99233 SBSQ HOSP IP/OBS HIGH 50: CPT | Mod: GC

## 2018-10-13 RX ORDER — IRON SUCROSE 20 MG/ML
200 INJECTION, SOLUTION INTRAVENOUS EVERY 24 HOURS
Qty: 0 | Refills: 0 | Status: DISCONTINUED | OUTPATIENT
Start: 2018-10-13 | End: 2018-10-15

## 2018-10-13 RX ORDER — TAMSULOSIN HYDROCHLORIDE 0.4 MG/1
0.4 CAPSULE ORAL AT BEDTIME
Qty: 0 | Refills: 0 | Status: DISCONTINUED | OUTPATIENT
Start: 2018-10-13 | End: 2018-10-15

## 2018-10-13 RX ADMIN — Medication 100 MILLIGRAM(S): at 21:22

## 2018-10-13 RX ADMIN — CARVEDILOL PHOSPHATE 6.25 MILLIGRAM(S): 80 CAPSULE, EXTENDED RELEASE ORAL at 21:22

## 2018-10-13 RX ADMIN — Medication 81 MILLIGRAM(S): at 12:08

## 2018-10-13 RX ADMIN — TAMSULOSIN HYDROCHLORIDE 0.4 MILLIGRAM(S): 0.4 CAPSULE ORAL at 21:22

## 2018-10-13 RX ADMIN — Medication 2000 UNIT(S): at 11:53

## 2018-10-13 RX ADMIN — OXYCODONE HYDROCHLORIDE 5 MILLIGRAM(S): 5 TABLET ORAL at 12:30

## 2018-10-13 RX ADMIN — Medication 1 MILLIGRAM(S): at 11:52

## 2018-10-13 RX ADMIN — SERTRALINE 25 MILLIGRAM(S): 25 TABLET, FILM COATED ORAL at 21:22

## 2018-10-13 RX ADMIN — Medication 500 MILLIGRAM(S): at 05:19

## 2018-10-13 RX ADMIN — Medication 100 MILLIGRAM(S): at 05:19

## 2018-10-13 RX ADMIN — IRON SUCROSE 110 MILLIGRAM(S): 20 INJECTION, SOLUTION INTRAVENOUS at 18:42

## 2018-10-13 RX ADMIN — OXYCODONE HYDROCHLORIDE 5 MILLIGRAM(S): 5 TABLET ORAL at 11:53

## 2018-10-13 RX ADMIN — Medication 325 MILLIGRAM(S): at 11:53

## 2018-10-13 RX ADMIN — CARVEDILOL PHOSPHATE 3.12 MILLIGRAM(S): 80 CAPSULE, EXTENDED RELEASE ORAL at 11:52

## 2018-10-13 RX ADMIN — Medication 500 MILLIGRAM(S): at 18:42

## 2018-10-13 RX ADMIN — ENOXAPARIN SODIUM 40 MILLIGRAM(S): 100 INJECTION SUBCUTANEOUS at 05:19

## 2018-10-13 NOTE — PHYSICAL THERAPY INITIAL EVALUATION ADULT - IMPAIRMENTS FOUND, PT EVAL
aerobic capacity/endurance/joint integrity and mobility/muscle strength/ROM/posture/gait, locomotion, and balance

## 2018-10-13 NOTE — PHYSICAL THERAPY INITIAL EVALUATION ADULT - PASSIVE RANGE OF MOTION EXAMINATION, REHAB EVAL
deficits as listed below/bilateral upper extremity Passive ROM was WNL (within normal limits)/Left LE Passive ROM was WFL (within functional limits)/PtAntonio yanes Left LE Passive ROM was WFL (within functional limits)/Pt. is able to flex hip and knee joints up to 45 degrees without pain. Pt was able to sit up (90 degrees PROM) with c/o increased pain./bilateral upper extremity Passive ROM was WNL (within normal limits)/deficits as listed below

## 2018-10-13 NOTE — PHYSICAL THERAPY INITIAL EVALUATION ADULT - DID THE PATIENT HAVE SURGERY?
Intermedullary nailing for R  neck of femur fracture/n/a Intermedullary nailing s/p R neck of femur fracture/yes

## 2018-10-13 NOTE — PHYSICAL THERAPY INITIAL EVALUATION ADULT - ACTIVE RANGE OF MOTION EXAMINATION, REHAB EVAL
deficits as listed below/michi. upper extremity Active ROM was WNL (within normal limits)/Left LE Active ROM was WFL (within functional limits) deficits as listed below/michi. upper extremity Active ROM was WNL (within normal limits)/Left LE Active ROM was WFL (within functional limits)/R Hip and knee flexion 0-15 degrees within gravity eliminated position

## 2018-10-13 NOTE — PHYSICAL THERAPY INITIAL EVALUATION ADULT - GENERAL OBSERVATIONS, REHAB EVAL
Alert, Awake, and Oriented, NAD, O2 2ml via NC, IV access on Alert, Awake, and Oriented, NAD, O2 2ml via NC, IV access on L arm, Alert, Awake, and Oriented, NAD, O2 2ml via NC, IV access on L arm, Bartlett catheter

## 2018-10-13 NOTE — PHYSICAL THERAPY INITIAL EVALUATION ADULT - WEIGHT-BEARING RESTRICTIONS: SIT/STAND, REHAB EVAL
PD HPI ABD PAIN





- Stated complaint


Stated Complaint: VOMITING BLOOD





- Chief complaint


Chief Complaint: Abd Pain





- History obtained from


History obtained from: Patient, Family (wife)





- History of Present Illness


Timing - onset: Other (66-year-old gentleman with history of esophageal spasm 

had his typical esophageal spasm last night but starting an hour ago started 

having coffee-ground emesis and melena not associated with significant 

abdominal or residual chest pain.  He denies shortness of breath.  He drinks he 

says every other day, a few drinks, denies NSAID use, no anticoagulated.  No 

history of ulcer.  He does have a history of extensive abdominal surgeries.  He 

does not take any PPI, it is only home medication is lisinopril.  He denies 

weakness or dizziness.)





Review of Systems


Ten Systems: 10 systems reviewed and negative


Constitutional: denies: Fever, Chills, Fatigue


Cardiac: denies: Palpitations, Pedal edema, Calf pain


Respiratory: denies: Dyspnea, Cough, Hemoptysis, Wheezing


GI: reports: Nausea, Vomiting, Hematemesis, Bloody / black stool.  denies: 

Abdominal Pain





PD PAST MEDICAL HISTORY





- Past Medical History


Cardiovascular: Congestive heart failure, Murmur


Respiratory: None


Neuro: None


Endocrine/Autoimmune: None


GI: Diverticulitis


: None


HEENT: None


Psych: None


Musculoskeletal: Other


Derm: None





- Past Surgical History


Past Surgical History: Yes


General: Gastric surgery, Hiatal hernia repair, Colonoscopy





- Present Medications


Home Medications: 


 Ambulatory Orders











 Medication  Instructions  Recorded  Confirmed


 


Nitroglycerin [Nitrostat] 0.4 mg SL Q5MIN 05/29/14 06/19/17


 


Lisinopril/Hydrochlorothiazide 1 tab PO DAILY 06/19/17 06/19/17





[Lisinopril-Hctz 20-25 mg Tab]   














- Allergies


Allergies/Adverse Reactions: 


 Allergies











Allergy/AdvReac Type Severity Reaction Status Date / Time


 


No Known Drug Allergies Allergy   Verified 06/19/17 12:45














- Social History


Does the pt smoke?: No


Smoking Status: Never smoker


Does the pt drink ETOH?: Yes





- Family History


Family history: reports: Non contributory





- Immunizations


Immunizations are current?: No


Immunizations: TDAP >10years/unknown





- POLST


Patient has POLST: No





PD ED PE NORMAL





- Vitals


Vital signs reviewed: Yes





- General


General: Alert and oriented X 3, Other (coffee-ground emesis at bedside)





- HEENT


HEENT: PERRL, EOMI





- Neck


Neck: Supple, no meningeal sign, No bony TTP





- Cardiac


Cardiac: No murmur, Other (tachycardic)





- Respiratory


Respiratory: No respiratory distress, Clear bilaterally





- Abdomen


Abdomen: Normal bowel sounds, Soft, Non tender, Other (well-healed surgical 

scars)





- Back


Back: No CVA TTP, No spinal TTP





- Derm


Derm: Normal color, Warm and dry





- Extremities


Extremities: No edema, No calf tenderness / cord





- Neuro


Neuro: Alert and oriented X 3, Normal speech





- Psych


Psych: Normal mood, Normal affect





Results





- Vitals


Vitals: 


 Vital Signs - 24 hr











  06/19/17 06/19/17





  12:42 13:07


 


Temperature 36.9 C 


 


Heart Rate 113 H 96


 


Respiratory 16 18





Rate  


 


Blood Pressure 140/89 H 137/84 H


 


O2 Saturation 100 97








 Oxygen











O2 Source                      Room air

















- EKG (time done)


  ** 1301


Rate: Rate (enter#) (103)


Rhythm: Sinus tachycardia


Axis: Normal


Intervals: Normal MS


QRS: Normal


Ischemia: Normal ST segments


Computer interpretation: Agree with computer





- Labs


Labs: 


 Laboratory Tests











  06/19/17 06/19/17 06/19/17





  12:45 12:45 12:45


 


WBC  13.1 H  


 


RBC  3.95 L  


 


Hgb  12.0 L  


 


Hct  35.9 L  


 


MCV  90.8  


 


MCH  30.5  


 


MCHC  33.5  


 


RDW  13.6  


 


Plt Count  206  


 


MPV  8.7  


 


Neut #  9.6 H  


 


Lymph #  2.3  


 


Mono #  1.0  


 


Eos #  0.1  


 


Baso #  0.0  


 


Absolute Nucleated RBC  0.00  


 


Nucleated RBCs  0.0  


 


PT   13.3 H 


 


INR   1.2 


 


APTT   25.9 


 


Sodium    139


 


Potassium    4.3


 


Chloride    105


 


Carbon Dioxide    22


 


Anion Gap    12.0


 


BUN    50 H


 


Creatinine    0.8


 


Estimated GFR (MDRD)    97


 


Glucose    217 H


 


Calcium    8.3 L


 


Total Bilirubin    1.8 H


 


AST    28


 


ALT    33


 


Alkaline Phosphatase    53


 


Troponin I   


 


Total Protein    6.8


 


Albumin    3.8


 


Globulin    3.0


 


Albumin/Globulin Ratio    1.3


 


Lipase    26


 


Ethyl Alcohol    < 5.0


 


Blood Type   


 


Antibody Screen   


 


Crossmatch IS Only   














  06/19/17 06/19/17





  12:45 12:45


 


WBC  


 


RBC  


 


Hgb  


 


Hct  


 


MCV  


 


MCH  


 


MCHC  


 


RDW  


 


Plt Count  


 


MPV  


 


Neut #  


 


Lymph #  


 


Mono #  


 


Eos #  


 


Baso #  


 


Absolute Nucleated RBC  


 


Nucleated RBCs  


 


PT  


 


INR  


 


APTT  


 


Sodium  


 


Potassium  


 


Chloride  


 


Carbon Dioxide  


 


Anion Gap  


 


BUN  


 


Creatinine  


 


Estimated GFR (MDRD)  


 


Glucose  


 


Calcium  


 


Total Bilirubin  


 


AST  


 


ALT  


 


Alkaline Phosphatase  


 


Troponin I  < 0.04 


 


Total Protein  


 


Albumin  


 


Globulin  


 


Albumin/Globulin Ratio  


 


Lipase  


 


Ethyl Alcohol  


 


Blood Type   O POSITIVE


 


Antibody Screen   NEGATIVE


 


Crossmatch IS Only   See Detail














- Rads (name of study)


  ** 2v chest


Radiology: EMP read contemporaneously (NAD)





PD MEDICAL DECISION MAKING





- ED course


ED course: 





This is a 66-year-old gentleman with potentially large upper GI bleed, he was 

attended to immediately and large-bore IV access was placed, 16 in the right 

a.c., and 18 in the left a.c.  He was administered IV Protonix.  This was 

preceded by esophageal spasms a Boerhaave's is considered, we'll get a chest x-

ray to screen.  Blood was readied.


His H&H is reassuring.  I spoke with Dr. Burgos for consultation, the surgeon 

at 1330, and Dr. Fitch, the hospitalist for admission at 1335.





Departure





- Departure


Disposition: 66 [facility] DC/Xfer


Clinical Impression: 


 Upper GI hemorrhage


Condition: Serious weight-bearing as tolerated

## 2018-10-13 NOTE — PHYSICAL THERAPY INITIAL EVALUATION ADULT - ADDITIONAL COMMENTS
Pt reported she prefered to use walker>cane due to balance deficit. Pt reported she preferred to use walker>cane due to balance deficit.

## 2018-10-13 NOTE — PHYSICAL THERAPY INITIAL EVALUATION ADULT - SKIN INTEGRITY
intact/surgical incision/ecchymosis surgical incision/swelling at surgical incision/intact surgical incision/intact

## 2018-10-13 NOTE — PHYSICAL THERAPY INITIAL EVALUATION ADULT - PLANNED THERAPY INTERVENTIONS, PT EVAL
gait training/stretching/bed mobility training/ROM/strengthening/balance training/postural re-education/transfer training

## 2018-10-13 NOTE — PHYSICAL THERAPY INITIAL EVALUATION ADULT - MANUAL MUSCLE TESTING RESULTS, REHAB EVAL
grossly assessed due to/R LE grossly assessed grossly assessed due to/pain at the surgical area. Pt's bilateral UE and L LE is 4/5; R LE is 1/5

## 2018-10-14 LAB
ANION GAP SERPL CALC-SCNC: 8 MMOL/L — SIGNIFICANT CHANGE UP (ref 5–17)
BUN SERPL-MCNC: 41 MG/DL — HIGH (ref 7–18)
CALCIUM SERPL-MCNC: 7.7 MG/DL — LOW (ref 8.4–10.5)
CHLORIDE SERPL-SCNC: 110 MMOL/L — HIGH (ref 96–108)
CO2 SERPL-SCNC: 20 MMOL/L — LOW (ref 22–31)
CREAT SERPL-MCNC: 1.32 MG/DL — HIGH (ref 0.5–1.3)
FERRITIN SERPL-MCNC: 318 NG/ML — HIGH (ref 15–150)
GLUCOSE SERPL-MCNC: 121 MG/DL — HIGH (ref 70–99)
HCT VFR BLD CALC: 23.9 % — LOW (ref 34.5–45)
HGB BLD-MCNC: 7.8 G/DL — LOW (ref 11.5–15.5)
MCHC RBC-ENTMCNC: 30.1 PG — SIGNIFICANT CHANGE UP (ref 27–34)
MCHC RBC-ENTMCNC: 32.7 GM/DL — SIGNIFICANT CHANGE UP (ref 32–36)
MCV RBC AUTO: 91.9 FL — SIGNIFICANT CHANGE UP (ref 80–100)
PLATELET # BLD AUTO: 153 K/UL — SIGNIFICANT CHANGE UP (ref 150–400)
POTASSIUM SERPL-MCNC: 4.2 MMOL/L — SIGNIFICANT CHANGE UP (ref 3.5–5.3)
POTASSIUM SERPL-SCNC: 4.2 MMOL/L — SIGNIFICANT CHANGE UP (ref 3.5–5.3)
RBC # BLD: 2.6 M/UL — LOW (ref 3.8–5.2)
RBC # FLD: 13.8 % — SIGNIFICANT CHANGE UP (ref 10.3–14.5)
SODIUM SERPL-SCNC: 138 MMOL/L — SIGNIFICANT CHANGE UP (ref 135–145)
WBC # BLD: 10 K/UL — SIGNIFICANT CHANGE UP (ref 3.8–10.5)
WBC # FLD AUTO: 10 K/UL — SIGNIFICANT CHANGE UP (ref 3.8–10.5)

## 2018-10-14 PROCEDURE — 99233 SBSQ HOSP IP/OBS HIGH 50: CPT | Mod: GC

## 2018-10-14 RX ORDER — ATORVASTATIN CALCIUM 80 MG/1
20 TABLET, FILM COATED ORAL AT BEDTIME
Qty: 0 | Refills: 0 | Status: DISCONTINUED | OUTPATIENT
Start: 2018-10-14 | End: 2018-10-15

## 2018-10-14 RX ADMIN — Medication 100 MILLIGRAM(S): at 05:21

## 2018-10-14 RX ADMIN — Medication 81 MILLIGRAM(S): at 12:07

## 2018-10-14 RX ADMIN — CARVEDILOL PHOSPHATE 3.12 MILLIGRAM(S): 80 CAPSULE, EXTENDED RELEASE ORAL at 10:28

## 2018-10-14 RX ADMIN — Medication 2 GRAM(S): at 17:27

## 2018-10-14 RX ADMIN — IRON SUCROSE 110 MILLIGRAM(S): 20 INJECTION, SOLUTION INTRAVENOUS at 17:48

## 2018-10-14 RX ADMIN — Medication 2000 UNIT(S): at 12:07

## 2018-10-14 RX ADMIN — Medication 500 MILLIGRAM(S): at 05:21

## 2018-10-14 RX ADMIN — Medication 1 MILLIGRAM(S): at 12:07

## 2018-10-14 RX ADMIN — Medication 500 MILLIGRAM(S): at 17:27

## 2018-10-14 RX ADMIN — ENOXAPARIN SODIUM 40 MILLIGRAM(S): 100 INJECTION SUBCUTANEOUS at 05:20

## 2018-10-14 RX ADMIN — PANTOPRAZOLE SODIUM 40 MILLIGRAM(S): 20 TABLET, DELAYED RELEASE ORAL at 08:09

## 2018-10-14 NOTE — PROGRESS NOTE ADULT - PROBLEM SELECTOR PLAN 2
coreg 3.125mg in AM and 6.25mg in PM  BP acceptable  monitor vitals

## 2018-10-14 NOTE — PROGRESS NOTE ADULT - PROBLEM SELECTOR PLAN 4
continue lovenox.
thyroid ultrasound  TSH high, maybe euthyroid sick syndrome, Ft4 normal. would repeat labs after acute illness is resolved.   patient states knowledge of her thyroid nodules.
thyroid ultrasound noted,   TSH high, maybe euthyroid sick syndrome, Ft4 normal. would repeat labs after acute illness is resolved.   patient states knowledge of her thyroid nodules.
venodyne boots as per ortho

## 2018-10-14 NOTE — PROGRESS NOTE ADULT - PROBLEM SELECTOR PLAN 1
CT pelvis shows right IC fracture  ortho following  pt and son agree to surgery  plan for OR tonight  pt NPO until after procedure  dilaudid and tramadol prn for pain control
Ortho input appreciated  S/P intermedullary nailing  continue DVT prophylaxis.   PT input appreciated, WBAT to the left LE
Ortho input appreciated  S/P intermedullary nailing  continue DVT prophylaxis.   PT input appreciated, WBAT to the left LE
Ortho input appreciated  S/P ORIF  continue DVT prophylaxis.   will continue caicedo for now since patient movement is hard, but will dc tomorrow.   Anemia noted today, will f.u tomorrow and if continues to trend down will transfuse.

## 2018-10-14 NOTE — PROGRESS NOTE ADULT - ASSESSMENT
89yFemale S/p R Hip IM Nailing POD# 2
HPI:  89 y/o Female from home lives alone and walk with walker pt w/ hx of HTN, endogenous hyperlipemia, paroxysmal Afib, cardiac pacemaker (Dr. Steven Goldberg), arthritis, macular degeneration, cataract P/w c/o R leg pain 7/10 in intensity, sharp, continuous, non radiating, s/p fall x today. Pt was at [facility] when she had mechanical fall onto R side after her foot got stuck. Worse when moving R leg. Better when not moving. Denies any Dizziness, Palpitations, or any sensation changes before she fell down.  Denies LOC, head trauma, HA, abd pain, CP, and any other complaints. NKDA, Pt denied Smoking and alcohol drinking.      In the ED pt got tylenol it improved pain little bit, pt had CT scan of head which was negative for acute intracranial pathology, CT bony pelvis showed Acute comminuted angulated right intertrochanteric fracture.   GOC discussed with Son: pt DNR, not DNI. (10 Oct 2018 20:39)
HPI:  89 y/o Female from home lives alone and walk with walker pt w/ hx of HTN, endogenous hyperlipemia, paroxysmal Afib, cardiac pacemaker (Dr. Steven Goldberg), arthritis, macular degeneration, cataract P/w c/o R leg pain 7/10 in intensity, sharp, continuous, non radiating, s/p fall x today. Pt was at [facility] when she had mechanical fall onto R side after her foot got stuck. Worse when moving R leg. Better when not moving. Denies any Dizziness, Palpitations, or any sensation changes before she fell down.  Denies LOC, head trauma, HA, abd pain, CP, and any other complaints. NKDA, Pt denied Smoking and alcohol drinking.      In the ED pt got tylenol it improved pain little bit, pt had CT scan of head which was negative for acute intracranial pathology, CT bony pelvis showed Acute comminuted angulated right intertrochanteric fracture.   GOC discussed with Son: pt DNR, not DNI. (10 Oct 2018 20:39)
Ms. Yanet Banerjee is a 89 year old female admitted for acute right IC fracture. Ortho on board - plan for surgery. Patient and her son were initially reluctant, but are now agreeing to surgery. Patient kept NPO in anticipation of procedure. As per ortho, pt to go to OR later this evening.
Ms. Yanet Banerjee is a 89 year old female admitted for acute right IC fracture. Ortho on board - plan for surgery. Patient and her son were initially reluctant, but are now agreeing to surgery. Patient kept NPO in anticipation of procedure. As per ortho, pt to go to OR later this evening.

## 2018-10-15 ENCOUNTER — TRANSCRIPTION ENCOUNTER (OUTPATIENT)
Age: 83
End: 2018-10-15

## 2018-10-15 VITALS
HEART RATE: 70 BPM | RESPIRATION RATE: 17 BRPM | DIASTOLIC BLOOD PRESSURE: 54 MMHG | SYSTOLIC BLOOD PRESSURE: 139 MMHG | OXYGEN SATURATION: 98 %

## 2018-10-15 LAB
ALBUMIN SERPL ELPH-MCNC: 1.9 G/DL — LOW (ref 3.5–5)
ALP SERPL-CCNC: 63 U/L — SIGNIFICANT CHANGE UP (ref 40–120)
ALT FLD-CCNC: 13 U/L DA — SIGNIFICANT CHANGE UP (ref 10–60)
ANION GAP SERPL CALC-SCNC: 9 MMOL/L — SIGNIFICANT CHANGE UP (ref 5–17)
AST SERPL-CCNC: 18 U/L — SIGNIFICANT CHANGE UP (ref 10–40)
BILIRUB SERPL-MCNC: 0.6 MG/DL — SIGNIFICANT CHANGE UP (ref 0.2–1.2)
BUN SERPL-MCNC: 40 MG/DL — HIGH (ref 7–18)
CALCIUM SERPL-MCNC: 8.2 MG/DL — LOW (ref 8.4–10.5)
CHLORIDE SERPL-SCNC: 111 MMOL/L — HIGH (ref 96–108)
CO2 SERPL-SCNC: 21 MMOL/L — LOW (ref 22–31)
CREAT SERPL-MCNC: 1.28 MG/DL — SIGNIFICANT CHANGE UP (ref 0.5–1.3)
GLUCOSE SERPL-MCNC: 105 MG/DL — HIGH (ref 70–99)
HCT VFR BLD CALC: 26.1 % — LOW (ref 34.5–45)
HGB BLD-MCNC: 8.6 G/DL — LOW (ref 11.5–15.5)
MCHC RBC-ENTMCNC: 30.1 PG — SIGNIFICANT CHANGE UP (ref 27–34)
MCHC RBC-ENTMCNC: 33 GM/DL — SIGNIFICANT CHANGE UP (ref 32–36)
MCV RBC AUTO: 91.1 FL — SIGNIFICANT CHANGE UP (ref 80–100)
PLATELET # BLD AUTO: 177 K/UL — SIGNIFICANT CHANGE UP (ref 150–400)
POTASSIUM SERPL-MCNC: 4.4 MMOL/L — SIGNIFICANT CHANGE UP (ref 3.5–5.3)
POTASSIUM SERPL-SCNC: 4.4 MMOL/L — SIGNIFICANT CHANGE UP (ref 3.5–5.3)
PROT SERPL-MCNC: 5.6 G/DL — LOW (ref 6–8.3)
RBC # BLD: 2.86 M/UL — LOW (ref 3.8–5.2)
RBC # FLD: 14.7 % — HIGH (ref 10.3–14.5)
SODIUM SERPL-SCNC: 141 MMOL/L — SIGNIFICANT CHANGE UP (ref 135–145)
WBC # BLD: 8.9 K/UL — SIGNIFICANT CHANGE UP (ref 3.8–10.5)
WBC # FLD AUTO: 8.9 K/UL — SIGNIFICANT CHANGE UP (ref 3.8–10.5)

## 2018-10-15 PROCEDURE — 99239 HOSP IP/OBS DSCHRG MGMT >30: CPT

## 2018-10-15 RX ORDER — TAMSULOSIN HYDROCHLORIDE 0.4 MG/1
1 CAPSULE ORAL
Qty: 0 | Refills: 0 | DISCHARGE
Start: 2018-10-15

## 2018-10-15 RX ORDER — DOCUSATE SODIUM 100 MG
1 CAPSULE ORAL
Qty: 0 | Refills: 0 | DISCHARGE
Start: 2018-10-15

## 2018-10-15 RX ORDER — FOLIC ACID 0.8 MG
1 TABLET ORAL
Qty: 0 | Refills: 0 | DISCHARGE
Start: 2018-10-15

## 2018-10-15 RX ORDER — OXYCODONE HYDROCHLORIDE 5 MG/1
1 TABLET ORAL
Qty: 0 | Refills: 0 | DISCHARGE
Start: 2018-10-15

## 2018-10-15 RX ORDER — TRAMADOL HYDROCHLORIDE 50 MG/1
0.5 TABLET ORAL
Qty: 0 | Refills: 0 | DISCHARGE
Start: 2018-10-15

## 2018-10-15 RX ORDER — ASCORBIC ACID 60 MG
1 TABLET,CHEWABLE ORAL
Qty: 0 | Refills: 0 | DISCHARGE
Start: 2018-10-15

## 2018-10-15 RX ORDER — ENOXAPARIN SODIUM 100 MG/ML
40 INJECTION SUBCUTANEOUS
Qty: 0 | Refills: 0 | DISCHARGE
Start: 2018-10-15

## 2018-10-15 RX ADMIN — Medication 1 MILLIGRAM(S): at 11:51

## 2018-10-15 RX ADMIN — INFLUENZA VIRUS VACCINE 0.5 MILLILITER(S): 15; 15; 15; 15 SUSPENSION INTRAMUSCULAR at 14:19

## 2018-10-15 RX ADMIN — CARVEDILOL PHOSPHATE 3.12 MILLIGRAM(S): 80 CAPSULE, EXTENDED RELEASE ORAL at 10:59

## 2018-10-15 RX ADMIN — SERTRALINE 25 MILLIGRAM(S): 25 TABLET, FILM COATED ORAL at 00:22

## 2018-10-15 RX ADMIN — Medication 81 MILLIGRAM(S): at 11:50

## 2018-10-15 RX ADMIN — OXYCODONE HYDROCHLORIDE 5 MILLIGRAM(S): 5 TABLET ORAL at 09:16

## 2018-10-15 RX ADMIN — OXYCODONE HYDROCHLORIDE 5 MILLIGRAM(S): 5 TABLET ORAL at 08:16

## 2018-10-15 RX ADMIN — CARVEDILOL PHOSPHATE 6.25 MILLIGRAM(S): 80 CAPSULE, EXTENDED RELEASE ORAL at 00:19

## 2018-10-15 RX ADMIN — Medication 2000 UNIT(S): at 11:51

## 2018-10-15 RX ADMIN — ENOXAPARIN SODIUM 40 MILLIGRAM(S): 100 INJECTION SUBCUTANEOUS at 06:49

## 2018-10-15 RX ADMIN — Medication 500 MILLIGRAM(S): at 06:48

## 2018-10-15 RX ADMIN — ATORVASTATIN CALCIUM 20 MILLIGRAM(S): 80 TABLET, FILM COATED ORAL at 00:19

## 2018-10-15 RX ADMIN — Medication 100 MILLIGRAM(S): at 00:19

## 2018-10-15 NOTE — DISCHARGE NOTE ADULT - PATIENT PORTAL LINK FT
You can access the [facility] Portal, offered by [facility], by registering with the following website: [email or web address]

## 2018-10-15 NOTE — DISCHARGE NOTE ADULT - MEDICATION SUMMARY - MEDICATIONS TO TAKE
I will START or STAY ON the medications listed below when I get home from the hospital:    aspirin 81 mg oral tablet  -- 1 tab(s) by mouth once a day  -- Indication: For Cad    Tylenol 500 mg oral tablet  -- 2 tab(s) by mouth 2 times a day (after meals)  -- Indication: For prn pain     oxyCODONE 5 mg oral tablet  -- 1 tab(s) by mouth every 4 hours, As needed, Mild Pain (1 - 3)  -- Indication: For prn pain     traMADol 50 mg oral tablet  -- 0.5 tab(s) by mouth every 8 hours, As needed, Moderate Pain (4 - 6)  -- Indication: For prn pain     tamsulosin 0.4 mg oral capsule  -- 1 cap(s) by mouth once a day (at bedtime)  -- Indication: For urinary retention     enoxaparin  -- 40 milligram(s) subcutaneous once a day  -- Indication: For dvt prophylaxis     sertraline 25 mg oral tablet  -- 1 tab(s) by mouth once a day (at bedtime)  -- Indication: For depression     atorvastatin 20 mg oral tablet  -- 1 tab(s) by mouth once a day (at bedtime)  -- Indication: For HLD (hyperlipidemia)    carvedilol 3.125 mg oral tablet  -- 1  by mouth once a day  -- half a pill  -- Indication: For CAD    carvedilol 6.25 mg oral tablet  -- 1 tab(s) by mouth once a day  -- Indication: For CAD    furosemide 40 mg oral tablet  -- 1.5 tab(s) by mouth once a day  -- Indication: For diuresis     docusate sodium 100 mg oral capsule  -- 1 cap(s) by mouth 3 times a day  -- Indication: For Constiaption     lutein 20 mg oral capsule  -- 1 cap(s) by mouth once a day  -- Indication: For HLD (hyperlipidemia)    omeprazole 20 mg oral delayed release capsule  -- 1 cap(s) by mouth once a day  -- Indication: For gerd    Vitamin D3 2000 intl units oral capsule  -- 1 cap(s) by mouth once a day  -- Indication: For vitamin     ascorbic acid 500 mg oral tablet  -- 1 tab(s) by mouth 2 times a day  -- Indication: For vitamin     folic acid 1 mg oral tablet  -- 1 tab(s) by mouth once a day  -- Indication: For vitamin

## 2018-10-15 NOTE — PROGRESS NOTE ADULT - SUBJECTIVE AND OBJECTIVE BOX
89yFemale    Diagnosis:  S/p R Hip IM Nailing POD# 2    Patient was seen and evaluated at bedside. Patient with no acute complaints.   Pain is well controlled.  Denies CP/SOB, dyspnea, paresthesias, N/V/D, palpitations.     Vital Signs Last 24 Hrs  T(C): 37.3 (13 Oct 2018 09:30), Max: 38.2 (12 Oct 2018 18:36)  T(F): 99.2 (13 Oct 2018 09:30), Max: 100.7 (12 Oct 2018 18:36)  HR: 71 (13 Oct 2018 09:30) (70 - 81)  BP: 116/34 (13 Oct 2018 09:30) (106/49 - 125/53)  BP(mean): --  RR: 17 (13 Oct 2018 09:30) (16 - 18)  SpO2: 97% (13 Oct 2018 09:30) (96% - 100%)  I&O's Detail    12 Oct 2018 07:01  -  13 Oct 2018 07:00  --------------------------------------------------------  IN:  Total IN: 0 mL    OUT:    Indwelling Catheter - Urethral: 850 mL  Total OUT: 850 mL    Total NET: -850 mL    Physical Exam:    General: AAOx3, NAD, resting comfortably in bed.    R Hip:  Dressing is C/D/I. Skin is pink and warm. Dressing changed today- Staples intact. No erythema. SILT.  Wound with no drainage, healing well.   Lower extremity:  No calf tenderness, calves are soft. 2+pulses. NVI.  (+) EHL/FHL/ADF/APF.  Good capillary refill. Warm, well-perfused.                           8.4    10.2  )-----------( 133      ( 13 Oct 2018 07:50 )             25.0     10-13    141  |  112<H>  |  48<H>  ----------------------------<  114<H>  4.2   |  20<L>  |  1.59<H>    Ca    7.7<L>      13 Oct 2018 07:50    TPro  5.3<L>  /  Alb  2.1<L>  /  TBili  0.6  /  DBili  x   /  AST  26  /  ALT  16  /  AlkPhos  53  10-13      Impression:  89yFemale S/p R Hip IM Nailing POD# 2  Plan:  -  Pain management  -  Dvt prophylaxis  -  Daily Physical Therapy:  WBAT to RLE  -  Discharge planning: Rehab  -  Incentive Spirometer  -  Dressing changed today  -  D/c staples on 10/26/18  -  Follow-Up with Dr. Bravo in ONE WEEK at [phone]  -  Pt orthopedically stable  -  Will D/w 
89yFemale    Diagnosis:  S/p R Hip IM Nailing POD#4    Patient was seen and evaluated at bedside. Patient with no acute complaints.   Denies CP/SOB, dyspnea, paresthesias, N/V/D, palpitations.     Vital Signs Last 24 Hrs  T(C): 37.1 (15 Oct 2018 06:07), Max: 37.2 (14 Oct 2018 22:17)  T(F): 98.7 (15 Oct 2018 06:07), Max: 98.9 (14 Oct 2018 22:17)  HR: 66 (15 Oct 2018 10:58) (66 - 85)  BP: 124/45 (15 Oct 2018 10:58) (113/41 - 166/65)  BP(mean): --  RR: 16 (15 Oct 2018 06:07) (16 - 17)  SpO2: 99% (15 Oct 2018 06:07) (97% - 100%)  I&O's Detail    14 Oct 2018 07:01  -  15 Oct 2018 07:00  --------------------------------------------------------  IN:    Packed Red Blood Cells: 350 mL  Total IN: 350 mL    OUT:    Indwelling Catheter - Urethral: 750 mL  Total OUT: 750 mL    Total NET: -400 mL          Physical Exam:    General: AAOx3, NAD, resting comfortably in bed.    R Hip:  Dressing has blood stain on distal incision. Skin is pink and warm. Staples intact. No erythema. SILT.    Lower extremity:  No calf tenderness, calves are soft. 2+pulses. NVI. 5/5 Strength of EHL/TA/gastrocnemius B/L.  Good capillary refill. Warm, well-perfused.                           8.6    8.9   )-----------( 177      ( 15 Oct 2018 07:07 )             26.1     10-15    141  |  111<H>  |  40<H>  ----------------------------<  105<H>  4.4   |  21<L>  |  1.28    Ca    8.2<L>      15 Oct 2018 07:07    TPro  5.6<L>  /  Alb  1.9<L>  /  TBili  0.6  /  DBili  x   /  AST  18  /  ALT  13  /  AlkPhos  63  10-15      Impression:  89yFemale S/p R Hip IM Nailing POD#4  Plan:  -  Pain management  -  Dvt prophylaxis with Lovenox  -  Daily Physical Theapy:  WBAT  of right lower extremity  -  Discharge planning: Home Vs. Rehab pending Physical therapy eval.  -  Dressing changed
Ortho Note POD# 1  89yFemale    Diagnosis:  S/p Right Hip IM Nailing POD# 1    Patient is seen and evaluated at bedside; offers no acute complaints. Pain is mild; well controlled.  Awaiting PT for ambulation.    Vital Signs Last 24 Hrs  T(C): 36.4 (12 Oct 2018 05:23), Max: 36.8 (11 Oct 2018 09:38)  T(F): 97.6 (12 Oct 2018 05:23), Max: 98.2 (11 Oct 2018 09:38)  HR: 43 (12 Oct 2018 05:23) (43 - 68)  BP: 115/44 (12 Oct 2018 05:23) (103/72 - 146/94)  BP(mean): 77 (11 Oct 2018 23:07) (75 - 112)  RR: 16 (12 Oct 2018 05:23) (13 - 19)  SpO2: 59% (12 Oct 2018 05:23) (59% - 100%)  I&O's Detail    11 Oct 2018 07:01  -  12 Oct 2018 07:00  --------------------------------------------------------  IN:    IV PiggyBack: 100 mL    Lactated Ringers IV Bolus: 1200 mL    sodium chloride 0.9%.: 200 mL  Total IN: 1500 mL    OUT:    Estimated Blood Loss: 150 mL    Indwelling Catheter - Urethral: 900 mL  Total OUT: 1050 mL    Total NET: 450 mL          Physical Exam:    General: AAOx3, in NAD, resting comfortably in bed.    Right hip:  In nl. alignment. Dressing is C/D/I.  Calves are soft, non-tender. 2+pulses. NVI.                          7.9    7.9   )-----------( 147      ( 12 Oct 2018 07:08 )             24.9     10-12    141  |  111<H>  |  63<H>  ----------------------------<  115<H>  3.9   |  19<L>  |  1.97<H>    Ca    7.2<L>      12 Oct 2018 07:08  Phos  5.0     10-11  Mg     2.7     10-11    TPro  6.8  /  Alb  3.1<L>  /  TBili  0.6  /  DBili  x   /  AST  125<H>  /  ALT  74<H>  /  AlkPhos  67  10-11      Impression:  89yFemale S/p Right Hip IM Nailing POD# 1  Plan:  -  Continue pain management  -  DVT prophylaxis with Lovenox  -  Daily Physical Therapy:  WBAT on RLE with walker  -  Discharge planning: Home Vs. Rehab pending Physical therapy eval.  -  Continue Antibiotics x 24hrs post-op  -  Discontinue Bartlett catheter today  -  Encouraged use of incentive spirometer  -  Case d/w Dr. Bravo
PGY1 Note discussed with supervising resident and primary attending.    Patient is a 89y old  Female who presents with a chief complaint of Right Intertrochanteric fracture (11 Oct 2018 09:37)      INTERVAL HPI/OVERNIGHT EVENTS:    Ms. Banerjee is seen at the bedside. She has no complaints at this time and informs us that she will consent to surgery at this hospital. I discussed with her son Vic Banerjee extensively about her case, and he is agreeing to our plan of care as well.    MEDICATIONS  (STANDING):  acetaminophen   Tablet .. 650 milliGRAM(s) Oral every 6 hours  aspirin enteric coated 81 milliGRAM(s) Oral daily  atorvastatin 20 milliGRAM(s) Oral at bedtime  carvedilol 6.25 milliGRAM(s) Oral <User Schedule>  carvedilol 3.125 milliGRAM(s) Oral <User Schedule>  cholecalciferol 2000 Unit(s) Oral daily  dextrose 5% + sodium chloride 0.9%. 1000 milliLiter(s) (60 mL/Hr) IV Continuous <Continuous>  influenza   Vaccine 0.5 milliLiter(s) IntraMuscular once  pantoprazole    Tablet 40 milliGRAM(s) Oral before breakfast  sertraline 25 milliGRAM(s) Oral at bedtime    MEDICATIONS  (PRN):  HYDROmorphone  Injectable 0.25 milliGRAM(s) IV Push every 6 hours PRN Severe Pain (7 - 10)  traMADol 25 milliGRAM(s) Oral every 8 hours PRN Moderate Pain (4 - 6)      Allergies    No Known Allergies    Intolerances    REVIEW OF SYSTEMS:  CONSTITUTIONAL: No fever, weight loss, or fatigue  RESPIRATORY: No cough, wheezing, chills or hemoptysis; No shortness of breath  CARDIOVASCULAR: No chest pain, palpitations, dizziness, or leg swelling  GASTROINTESTINAL: No abdominal or epigastric pain. No nausea, vomiting, or hematemesis; No diarrhea or constipation. No melena or hematochezia.  NEUROLOGICAL: No headaches, memory loss, loss of strength, numbness, or tremors  SKIN: No itching, burning, rashes, or lesions     Vital Signs Last 24 Hrs  T(C): 36.4 (11 Oct 2018 14:06), Max: 37.2 (10 Oct 2018 19:04)  T(F): 97.6 (11 Oct 2018 14:06), Max: 99 (10 Oct 2018 19:04)  HR: 63 (11 Oct 2018 14:06) (60 - 74)  BP: 143/79 (11 Oct 2018 14:06) (106/54 - 143/79)  BP(mean): --  RR: 16 (11 Oct 2018 14:06) (16 - 18)  SpO2: 95% (11 Oct 2018 14:06) (95% - 98%)    PHYSICAL EXAM:  GENERAL: NAD  HEAD:  Atraumatic, Normocephalic  EYES: EOMI, PERRLA, conjunctiva and sclera clear  NECK: Supple, No JVD, Normal thyroid  CHEST/LUNG: Clear to percussion bilaterally; No rales, rhonchi, wheezing, or rubs  HEART: Regular rate and rhythm; No murmurs, rubs, or gallops  ABDOMEN: Soft, Nontender, Nondistended; Bowel sounds present  NERVOUS SYSTEM:  Alert & Oriented X3, Good concentration;  EXTREMITIES:  2+ Peripheral Pulses, No clubbing, cyanosis, or edema    LABS:                        11.0   9.6   )-----------( 216      ( 11 Oct 2018 06:35 )             33.7     10-11    137  |  104  |  76<H>  ----------------------------<  147<H>  5.1   |  21<L>  |  2.48<H>    Ca    8.5      11 Oct 2018 06:35  Phos  5.0     10-11  Mg     2.7     10-11    TPro  6.8  /  Alb  3.1<L>  /  TBili  0.6  /  DBili  x   /  AST  125<H>  /  ALT  74<H>  /  AlkPhos  67  10-11    PT/INR - ( 10 Oct 2018 18:08 )   PT: 11.4 sec;   INR: 1.04 ratio         PTT - ( 10 Oct 2018 18:08 )  PTT:33.7 sec      RADIOLOGY & ADDITIONAL TESTS:    Imaging Personally Reviewed:  [X ] YES  [ ] NO    Consultant(s) Notes Reviewed:  [X ] YES  [ ] NO    CT PELVIS    Impression:    Acute comminuted angulated right intertrochanteric fracture.
Patient is a 89y old  Female who presents with a chief complaint of Right Intertrochanteric fracture (13 Oct 2018 11:21)      INTERVAL HPI/OVERNIGHT EVENTS:    Allergies    No Known Allergies    Intolerances        REVIEW OF SYSTEMS:  CONSTITUTIONAL: No fever, weight loss, or fatigue  EYES: No eye pain, visual disturbances, or discharge  RESPIRATORY: No cough, wheezing or shortness of breath  CARDIOVASCULAR: No chest pain, feeling of heart beats  GASTROINTESTINAL: No abdominal or epigastric pain. No nausea, vomiting; No diarrhea or constipation.  GENITOURINARY: No dysuria, frequency, hematuria  NEUROLOGICAL: No headaches  MUSCULOSKELETAL: No joint pain  PSYCHIATRIC: No depression or anxiety  HEME/LYMPH: No easy bruising, or bleeding gums  ALLERGY AND IMMUNOLOGIC: No hives or eczema    Medications:  acetaminophen    Suspension .. 650 milliGRAM(s) Oral every 6 hours PRN Temp greater or equal to 38C (100.4F)  acetaminophen   Tablet .. 650 milliGRAM(s) Oral every 6 hours PRN Mild Pain (1 - 3)  ascorbic acid 500 milliGRAM(s) Oral two times a day  aspirin enteric coated 81 milliGRAM(s) Oral daily  carvedilol 6.25 milliGRAM(s) Oral <User Schedule>  carvedilol 3.125 milliGRAM(s) Oral <User Schedule>  cholecalciferol 2000 Unit(s) Oral daily  docusate sodium 100 milliGRAM(s) Oral three times a day  enoxaparin Injectable 40 milliGRAM(s) SubCutaneous every 24 hours  ferrous    sulfate 325 milliGRAM(s) Oral three times a day with meals  folic acid 1 milliGRAM(s) Oral daily  HYDROmorphone  Injectable 0.25 milliGRAM(s) IV Push every 6 hours PRN Severe Pain (7 - 10)  iron sucrose IVPB 200 milliGRAM(s) IV Intermittent every 24 hours  morphine  - Injectable 2 milliGRAM(s) IV Push every 4 hours PRN Severe Pain (7 - 10)  oxyCODONE    IR 5 milliGRAM(s) Oral every 4 hours PRN Mild Pain (1 - 3)  pantoprazole    Tablet 40 milliGRAM(s) Oral before breakfast  sertraline 25 milliGRAM(s) Oral at bedtime  sodium chloride 0.9%. 1000 milliLiter(s) IV Continuous <Continuous>  traMADol 25 milliGRAM(s) Oral every 8 hours PRN Moderate Pain (4 - 6)      PHYSICAL EXAM:  Vital Signs Last 24 Hrs  T(C): 37.4 (13 Oct 2018 14:07), Max: 38.2 (12 Oct 2018 18:36)  T(F): 99.4 (13 Oct 2018 14:07), Max: 100.7 (12 Oct 2018 18:36)  HR: 80 (13 Oct 2018 14:07) (70 - 81)  BP: 125/43 (13 Oct 2018 14:07) (110/44 - 125/53)  BP(mean): --  RR: 17 (13 Oct 2018 14:07) (17 - 18)  SpO2: 90% (13 Oct 2018 14:07) (90% - 100%)    GENERAL: NAD  HEAD:  Atraumatic, Normocephalic  EYES: EOMI, PERRLA, conjunctiva and sclera clear  ENT: moist mucous membranes  NECK: Supple, No JVD, Normal thyroid  NERVOUS SYSTEM:  Alert & Oriented X3, Good concentration; Motor Strength 5/5 B/L upper and lower extremities; DTRs 2+ intact and symmetric  CHEST/LUNG: No rales, rhonchi, wheezing, or rubs  HEART: Regular rate and rhythm; No murmurs, rubs, or gallops  ABDOMEN: Soft, Nontender, Nondistended; Bowel sounds present  EXTREMITIES:  2+ Peripheral Pulses, No clubbing, cyanosis, or edema  SKIN: No rashes or lesions    LABS:                        8.4    10.2  )-----------( 133      ( 13 Oct 2018 07:50 )             25.0     10-13    141  |  112<H>  |  48<H>  ----------------------------<  114<H>  4.2   |  20<L>  |  1.59<H>    Ca    7.7<L>      13 Oct 2018 07:50    TPro  5.3<L>  /  Alb  2.1<L>  /  TBili  0.6  /  DBili  x   /  AST  26  /  ALT  16  /  AlkPhos  53  10-13    LIVER FUNCTIONS - ( 13 Oct 2018 07:50 )  Alb: 2.1 g/dL / Pro: 5.3 g/dL / ALK PHOS: 53 U/L / ALT: 16 U/L DA / AST: 26 U/L / GGT: x                     Culture & Sensitivity:   CAPILLARY BLOOD GLUCOSE          10-12 @ 07:01  -  10-13 @ 07:00  --------------------------------------------------------  IN: 0 mL / OUT: 850 mL / NET: -850 mL        RADIOLOGY & ADDITIONAL TESTS:  Radiology testing independently reviewed:     Consultant(s) Notes Reviewed:  [ x] YES  [ ] NO    Care Discussed with Consultants/Other Providers [ x] YES  [ ] NO
Patient is a 89y old  Female who presents with a chief complaint of Right Intertrochanteric fracture (13 Oct 2018 15:59)      INTERVAL HPI/OVERNIGHT EVENTS:  Patient seen and examined at bedside, her son at her side,   discussed at length care with him, he would like to start statins and monitor liver functions meanwhile.     Allergies    No Known Allergies    Intolerances    REVIEW OF SYSTEMS:  CONSTITUTIONAL: No fever, weight loss, or fatigue  EYES: No eye pain, visual disturbances, or discharge  RESPIRATORY: No cough, wheezing or shortness of breath  CARDIOVASCULAR: No chest pain, feeling of heart beats  GASTROINTESTINAL: No abdominal or epigastric pain. No nausea, vomiting; No diarrhea or constipation.  GENITOURINARY: No dysuria, frequency, hematuria  NEUROLOGICAL: No headaches  MUSCULOSKELETAL: No joint pain  PSYCHIATRIC: No depression or anxiety  HEME/LYMPH: No easy bruising, or bleeding gums  ALLERGY AND IMMUNOLOGIC: No hives or eczema    Medications:  acetaminophen    Suspension .. 650 milliGRAM(s) Oral every 6 hours PRN Temp greater or equal to 38C (100.4F)  acetaminophen   Tablet .. 650 milliGRAM(s) Oral every 6 hours PRN Mild Pain (1 - 3)  ascorbic acid 500 milliGRAM(s) Oral two times a day  aspirin enteric coated 81 milliGRAM(s) Oral daily  carvedilol 6.25 milliGRAM(s) Oral <User Schedule>  carvedilol 3.125 milliGRAM(s) Oral <User Schedule>  cholecalciferol 2000 Unit(s) Oral daily  docusate sodium 100 milliGRAM(s) Oral three times a day  enoxaparin Injectable 40 milliGRAM(s) SubCutaneous every 24 hours  folic acid 1 milliGRAM(s) Oral daily  HYDROmorphone  Injectable 0.25 milliGRAM(s) IV Push every 6 hours PRN Severe Pain (7 - 10)  iron sucrose IVPB 200 milliGRAM(s) IV Intermittent every 24 hours  morphine  - Injectable 2 milliGRAM(s) IV Push every 4 hours PRN Severe Pain (7 - 10)  oxyCODONE    IR 5 milliGRAM(s) Oral every 4 hours PRN Mild Pain (1 - 3)  pantoprazole    Tablet 40 milliGRAM(s) Oral before breakfast  sertraline 25 milliGRAM(s) Oral at bedtime  tamsulosin 0.4 milliGRAM(s) Oral at bedtime  traMADol 25 milliGRAM(s) Oral every 8 hours PRN Moderate Pain (4 - 6)      PHYSICAL EXAM:  Vital Signs Last 24 Hrs  T(C): 36.8 (14 Oct 2018 13:04), Max: 37.1 (13 Oct 2018 22:05)  T(F): 98.2 (14 Oct 2018 13:04), Max: 98.7 (13 Oct 2018 22:05)  HR: 68 (14 Oct 2018 13:04) (64 - 98)  BP: 113/41 (14 Oct 2018 13:04) (110/59 - 127/53)  BP(mean): --  RR: 16 (14 Oct 2018 13:04) (16 - 17)  SpO2: 97% (14 Oct 2018 13:04) (94% - 98%)    GENERAL: NAD  HEAD:  Atraumatic, Normocephalic  EYES: EOMI, PERRLA, conjunctiva and sclera clear  ENT: moist mucous membranes  NECK: Supple, No JVD, Normal thyroid  NERVOUS SYSTEM:  Alert & Oriented X3, Good concentration;  CHEST/LUNG: No rales, rhonchi, wheezing, or rubs  HEART: Regular rate and rhythm; No murmurs, rubs, or gallops  ABDOMEN: Soft, Nontender, Nondistended; Bowel sounds present  EXTREMITIES:  2+ Peripheral Pulses, No clubbing, cyanosis, or edema  SKIN: No rashes or lesions    LABS:                        7.8    10.0  )-----------( 153      ( 14 Oct 2018 07:36 )             23.9     10-14    138  |  110<H>  |  41<H>  ----------------------------<  121<H>  4.2   |  20<L>  |  1.32<H>    Ca    7.7<L>      14 Oct 2018 07:36    TPro  5.3<L>  /  Alb  2.1<L>  /  TBili  0.6  /  DBili  x   /  AST  26  /  ALT  16  /  AlkPhos  53  10-13    LIVER FUNCTIONS - ( 13 Oct 2018 07:50 )  Alb: 2.1 g/dL / Pro: 5.3 g/dL / ALK PHOS: 53 U/L / ALT: 16 U/L DA / AST: 26 U/L / GGT: x                     Culture & Sensitivity:   CAPILLARY BLOOD GLUCOSE          10-13 @ 07:01  -  10-14 @ 07:00  --------------------------------------------------------  IN: 0 mL / OUT: 700 mL / NET: -700 mL        RADIOLOGY & ADDITIONAL TESTS:  Radiology testing independently reviewed:     Consultant(s) Notes Reviewed:  [ x] YES  [ ] NO    Care Discussed with Consultants/Other Providers [ x] YES  [ ] NO
Patient is a 89y old  Female who presents with a chief complaint of Right Intertrochanteric fracture (12 Oct 2018 08:59)    INTERVAL HPI/OVERNIGHT EVENTS:  Patient seen and examined at bedside, feels ok  Her pain is mild and tolerable.     Allergies    No Known Allergies    Intolerances        REVIEW OF SYSTEMS:  CONSTITUTIONAL: No fever, weight loss, or fatigue  EYES: No eye pain, visual disturbances, or discharge  RESPIRATORY: No cough, wheezing or shortness of breath  CARDIOVASCULAR: No chest pain, feeling of heart beats  GASTROINTESTINAL: No abdominal or epigastric pain. No nausea, vomiting; No diarrhea or constipation.  GENITOURINARY: No dysuria, frequency, hematuria  NEUROLOGICAL: No headaches  MUSCULOSKELETAL: moderate right hip pain at the surgical site.   PSYCHIATRIC: No depression or anxiety  HEME/LYMPH: No easy bruising, or bleeding gums  ALLERGY AND IMMUNOLOGIC: No hives or eczema    Medications:  acetaminophen   Tablet .. 650 milliGRAM(s) Oral every 6 hours PRN Mild Pain (1 - 3)  ascorbic acid 500 milliGRAM(s) Oral two times a day  aspirin enteric coated 81 milliGRAM(s) Oral daily  carvedilol 6.25 milliGRAM(s) Oral <User Schedule>  carvedilol 3.125 milliGRAM(s) Oral <User Schedule>  cholecalciferol 2000 Unit(s) Oral daily  docusate sodium 100 milliGRAM(s) Oral three times a day  enoxaparin Injectable 40 milliGRAM(s) SubCutaneous every 24 hours  ferrous    sulfate 325 milliGRAM(s) Oral three times a day with meals  folic acid 1 milliGRAM(s) Oral daily  HYDROmorphone  Injectable 0.25 milliGRAM(s) IV Push every 6 hours PRN Severe Pain (7 - 10)  morphine  - Injectable 2 milliGRAM(s) IV Push every 4 hours PRN Severe Pain (7 - 10)  oxyCODONE    IR 5 milliGRAM(s) Oral every 4 hours PRN Mild Pain (1 - 3)  pantoprazole    Tablet 40 milliGRAM(s) Oral before breakfast  sertraline 25 milliGRAM(s) Oral at bedtime  sodium chloride 0.9%. 1000 milliLiter(s) IV Continuous <Continuous>  traMADol 25 milliGRAM(s) Oral every 8 hours PRN Moderate Pain (4 - 6)      PHYSICAL EXAM:  Vital Signs Last 24 Hrs  T(C): 36.2 (12 Oct 2018 15:45), Max: 37.1 (12 Oct 2018 10:55)  T(F): 97.1 (12 Oct 2018 15:45), Max: 98.7 (12 Oct 2018 10:55)  HR: 71 (12 Oct 2018 15:45) (43 - 76)  BP: 106/49 (12 Oct 2018 15:45) (103/72 - 146/94)  BP(mean): 77 (11 Oct 2018 23:07) (75 - 112)  RR: 16 (12 Oct 2018 15:45) (13 - 19)  SpO2: 99% (12 Oct 2018 15:45) (59% - 100%)    GENERAL: NAD, has caicedo on.   HEAD:  Atraumatic, Normocephalic  EYES: EOMI, PERRLA, conjunctiva and sclera clear  ENT: moist mucous membranes  NECK: Supple, No JVD, Normal thyroid  NERVOUS SYSTEM:  Alert & Oriented X3,  CHEST/LUNG: No rales, rhonchi, wheezing, or rubs  HEART: Regular rate and rhythm; No murmurs, rubs, or gallops  ABDOMEN: Soft, Nontender, Nondistended; Bowel sounds present  EXTREMITIES:  has Right LE dressing over the wound  SKIN: No rashes or lesions    LABS:                        7.9    7.9   )-----------( 147      ( 12 Oct 2018 07:08 )             24.9     10-12    141  |  111<H>  |  63<H>  ----------------------------<  115<H>  3.9   |  19<L>  |  1.97<H>    Ca    7.2<L>      12 Oct 2018 07:08  Phos  5.0     10-11  Mg     2.7     10-11    TPro  6.8  /  Alb  3.1<L>  /  TBili  0.6  /  DBili  x   /  AST  125<H>  /  ALT  74<H>  /  AlkPhos  67  10-11    LIVER FUNCTIONS - ( 11 Oct 2018 06:35 )  Alb: 3.1 g/dL / Pro: 6.8 g/dL / ALK PHOS: 67 U/L / ALT: 74 U/L DA / AST: 125 U/L / GGT: x           Culture & Sensitivity:   CAPILLARY BLOOD GLUCOSE    10-11 @ 07:01  -  10-12 @ 07:00  --------------------------------------------------------  IN: 1500 mL / OUT: 1050 mL / NET: 450 mL    10-12 @ 07:01  -  10-12 @ 18:14  --------------------------------------------------------  IN: 0 mL / OUT: 400 mL / NET: -400 mL        RADIOLOGY & ADDITIONAL TESTS:  Radiology testing independently reviewed:     Consultant(s) Notes Reviewed:  [ x] YES  [ ] NO    Care Discussed with Consultants/Other Providers [ x] YES  [ ] NO

## 2018-10-15 NOTE — DISCHARGE NOTE ADULT - CARE PROVIDER_API CALL
Iglesia Bravo (DO), Orthopaedic Surgery  [address] [place] [address]  Phone: [phone]  Fax: [phone]

## 2018-10-15 NOTE — DISCHARGE NOTE ADULT - CARE PLAN
Principal Discharge DX:	Hip fracture  Goal:	s/p R IM nailing, increase range of motion  Assessment and plan of treatment:	-  D/c staples on 10/26/18  -  Follow-Up with Dr. Bravo in ONE WEEK at [phone]  [facility] for 2 weeks  Secondary Diagnosis:	Anemia due to blood loss  Goal:	received 2 units please check cbc in 3 days  Secondary Diagnosis:	Essential hypertension  Goal:	Continue regimen attached  Secondary Diagnosis:	HLD (hyperlipidemia)  Goal:	Continue regimen attached

## 2018-10-15 NOTE — DISCHARGE NOTE ADULT - HOSPITAL COURSE
89 y/o Female from home lives alone and walk with walker pt w/ hx of HTN, endogenous hyperlipemia, paroxysmal Afib, cardiac pacemaker (Dr. Steven Goldberg), arthritis, macular degeneration, cataract P/w c/o R leg pain 7/10 in intensity, sharp, continuous, non radiating, s/p fall x today. Pt was at [facility] when she had mechanical fall onto R side after her foot got stuck. Worse when moving R leg. Better when not moving. Denies any Dizziness, Palpitations, or any sensation changes before she fell down.  Denies LOC, head trauma, HA, abd pain, CP, and any other complaints. NKDA, Pt denied Smoking and alcohol drinking.      In the ED pt got tylenol it improved pain little bit, pt had CT scan of head which was negative for acute intracranial pathology, CT bony pelvis showed Acute comminuted angulated right intertrochanteric fracture.   Patient was taken to the OR on 10/11 S/P intermedullary nailing. Patient tolerated procedure well. Required 2 units prbc for acute blood loss anemia. Patient for d/c to [facility]

## 2018-10-15 NOTE — DISCHARGE NOTE ADULT - PLAN OF CARE
s/p R IM nailing, increase range of motion -  D/c staples on 10/26/18  -  Follow-Up with Dr. Bravo in ONE WEEK at [phone]  [facility] for 2 weeks received 2 units please check cbc in 3 days Continue regimen attached

## 2018-10-15 NOTE — PROGRESS NOTE ADULT - REASON FOR ADMISSION
Right Intertrochanteric fracture

## 2018-10-15 NOTE — PROGRESS NOTE ADULT - PROVIDER SPECIALTY LIST ADULT
Internal Medicine
Orthopedics
Internal Medicine

## 2018-11-11 PROCEDURE — 84100 ASSAY OF PHOSPHORUS: CPT

## 2018-11-11 PROCEDURE — 83550 IRON BINDING TEST: CPT

## 2018-11-11 PROCEDURE — 86900 BLOOD TYPING SEROLOGIC ABO: CPT

## 2018-11-11 PROCEDURE — 99285 EMERGENCY DEPT VISIT HI MDM: CPT | Mod: 25

## 2018-11-11 PROCEDURE — C1713: CPT

## 2018-11-11 PROCEDURE — 76000 FLUOROSCOPY <1 HR PHYS/QHP: CPT

## 2018-11-11 PROCEDURE — 80048 BASIC METABOLIC PNL TOTAL CA: CPT

## 2018-11-11 PROCEDURE — C1889: CPT

## 2018-11-11 PROCEDURE — 97530 THERAPEUTIC ACTIVITIES: CPT

## 2018-11-11 PROCEDURE — 72192 CT PELVIS W/O DYE: CPT

## 2018-11-11 PROCEDURE — 85610 PROTHROMBIN TIME: CPT

## 2018-11-11 PROCEDURE — 84439 ASSAY OF FREE THYROXINE: CPT

## 2018-11-11 PROCEDURE — 84481 FREE ASSAY (FT-3): CPT

## 2018-11-11 PROCEDURE — 82746 ASSAY OF FOLIC ACID SERUM: CPT

## 2018-11-11 PROCEDURE — 80061 LIPID PANEL: CPT

## 2018-11-11 PROCEDURE — 36430 TRANSFUSION BLD/BLD COMPNT: CPT

## 2018-11-11 PROCEDURE — 97110 THERAPEUTIC EXERCISES: CPT

## 2018-11-11 PROCEDURE — 71250 CT THORAX DX C-: CPT

## 2018-11-11 PROCEDURE — P9040: CPT

## 2018-11-11 PROCEDURE — 84443 ASSAY THYROID STIM HORMONE: CPT

## 2018-11-11 PROCEDURE — 70450 CT HEAD/BRAIN W/O DYE: CPT

## 2018-11-11 PROCEDURE — 80053 COMPREHEN METABOLIC PANEL: CPT

## 2018-11-11 PROCEDURE — 86850 RBC ANTIBODY SCREEN: CPT

## 2018-11-11 PROCEDURE — 83735 ASSAY OF MAGNESIUM: CPT

## 2018-11-11 PROCEDURE — 93306 TTE W/DOPPLER COMPLETE: CPT

## 2018-11-11 PROCEDURE — 83036 HEMOGLOBIN GLYCOSYLATED A1C: CPT

## 2018-11-11 PROCEDURE — C1769: CPT

## 2018-11-11 PROCEDURE — 86923 COMPATIBILITY TEST ELECTRIC: CPT

## 2018-11-11 PROCEDURE — 73551 X-RAY EXAM OF FEMUR 1: CPT

## 2018-11-11 PROCEDURE — 82607 VITAMIN B-12: CPT

## 2018-11-11 PROCEDURE — 86901 BLOOD TYPING SEROLOGIC RH(D): CPT

## 2018-11-11 PROCEDURE — 90686 IIV4 VACC NO PRSV 0.5 ML IM: CPT

## 2018-11-11 PROCEDURE — 85027 COMPLETE CBC AUTOMATED: CPT

## 2018-11-11 PROCEDURE — 85730 THROMBOPLASTIN TIME PARTIAL: CPT

## 2018-11-11 PROCEDURE — 82728 ASSAY OF FERRITIN: CPT

## 2018-11-11 PROCEDURE — 71045 X-RAY EXAM CHEST 1 VIEW: CPT

## 2018-11-11 PROCEDURE — 82306 VITAMIN D 25 HYDROXY: CPT

## 2018-11-14 ENCOUNTER — APPOINTMENT (OUTPATIENT)
Dept: GERIATRICS | Facility: CLINIC | Age: 83
End: 2018-11-14

## 2019-06-04 ENCOUNTER — APPOINTMENT (OUTPATIENT)
Dept: SURGICAL ONCOLOGY | Facility: CLINIC | Age: 84
End: 2019-06-04
Payer: MEDICARE

## 2019-06-04 VITALS
SYSTOLIC BLOOD PRESSURE: 178 MMHG | OXYGEN SATURATION: 93 % | TEMPERATURE: 98.4 F | BODY MASS INDEX: 29.48 KG/M2 | HEART RATE: 88 BPM | DIASTOLIC BLOOD PRESSURE: 84 MMHG | WEIGHT: 156.13 LBS | HEIGHT: 61 IN

## 2019-06-04 VITALS — OXYGEN SATURATION: 95 % | SYSTOLIC BLOOD PRESSURE: 197 MMHG | DIASTOLIC BLOOD PRESSURE: 94 MMHG | HEART RATE: 69 BPM

## 2019-06-04 DIAGNOSIS — N60.19 DIFFUSE CYSTIC MASTOPATHY OF UNSPECIFIED BREAST: ICD-10-CM

## 2019-06-04 PROCEDURE — 99214 OFFICE O/P EST MOD 30 MIN: CPT

## 2019-06-04 NOTE — ASSESSMENT
[FreeTextEntry1] : IMP:\par Fibrocystic breasts\par \par PLAN:\par RTO yearly with mammo/sono (next 5/2020)\par

## 2019-06-04 NOTE — PHYSICAL EXAM
[Normal] : supple, no neck mass and thyroid not enlarged [Normal] : oriented to person, place and time, with appropriate affect [Normal Axillary Lymph Nodes] : normal axillary lymph nodes [Normal Supraclavicular Lymph Nodes] : normal supraclavicular lymph nodes [de-identified] : symmetric 2+ non pitting pedal edema [de-identified] : Fibrocystic changes but no dominant mass or nipple discharge. [de-identified] : 8 mm keratosis left side of nose

## 2019-06-04 NOTE — HISTORY OF PRESENT ILLNESS
[de-identified] : 89 year-old female presents for annual breast exam.  She has a known history of fibrocystic breast changes with a prior core biopsy of a 2.0 cm nodule in the right breast at 12:00 (benign fibroadenomatoid changes).  Most recent breast imaging consists of a mammogram and sonogram performed in May 2019 with BIRADS 2 findings.  She was also being followed by Dr. Ashford for a stable left nasal keratosis, but this was ultimately removed with benign findings per the patient.   She remains without a family history of breast or ovarian cancer.  She has been following cardiology for bilateral pedal edema which is controlled with Lasix. \par \par Her blood pressure is elevated in the office today but she states she did not take her Lasix this morning since she was going to the doctor.  She states her blood pressure has been controlled well recently.  She denies headache, nausea or blurry vision.  She remains without complaints of palpable breast mass or nipple discharge. \par

## 2019-06-04 NOTE — CONSULT LETTER
[Dear  ___] : Dear  [unfilled], [Courtesy Letter:] : I had the pleasure of seeing your patient, [unfilled], in my office today. [Please see my note below.] : Please see my note below. [Consult Closing:] : Thank you very much for allowing me to participate in the care of this patient.  If you have any questions, please do not hesitate to contact me. [Sincerely,] : Sincerely, [FreeTextEntry1] : I will keep you informed of my annual follow-up. [FreeTextEntry3] : Kiel Harris MD FACS\par Chief of Surgical Oncology\par \par

## 2019-06-08 ENCOUNTER — TRANSCRIPTION ENCOUNTER (OUTPATIENT)
Age: 84
End: 2019-06-08

## 2019-09-23 ENCOUNTER — TRANSCRIPTION ENCOUNTER (OUTPATIENT)
Age: 84
End: 2019-09-23

## 2019-11-28 ENCOUNTER — INPATIENT (INPATIENT)
Facility: HOSPITAL | Age: 84
LOS: 3 days | Discharge: NURSING, REHAB OR LONG-TERM CARE | DRG: 563 | End: 2019-12-02
Attending: INTERNAL MEDICINE | Admitting: INTERNAL MEDICINE
Payer: MEDICARE

## 2019-11-28 VITALS
HEART RATE: 75 BPM | RESPIRATION RATE: 16 BRPM | TEMPERATURE: 98 F | HEIGHT: 64 IN | SYSTOLIC BLOOD PRESSURE: 193 MMHG | OXYGEN SATURATION: 98 % | DIASTOLIC BLOOD PRESSURE: 78 MMHG | WEIGHT: 164.91 LBS

## 2019-11-28 DIAGNOSIS — I48.0 PAROXYSMAL ATRIAL FIBRILLATION: ICD-10-CM
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
## 2019-11-28 DIAGNOSIS — S82.209A UNSPECIFIED FRACTURE OF SHAFT OF UNSPECIFIED TIBIA, INITIAL ENCOUNTER FOR CLOSED FRACTURE: ICD-10-CM

## 2019-11-28 DIAGNOSIS — N17.9 ACUTE KIDNEY FAILURE, UNSPECIFIED: ICD-10-CM

## 2019-11-28 DIAGNOSIS — Z41.9 ENCOUNTER FOR PROCEDURE FOR PURPOSES OTHER THAN REMEDYING HEALTH STATE, UNSPECIFIED: Chronic | ICD-10-CM

## 2019-11-28 DIAGNOSIS — I10 ESSENTIAL (PRIMARY) HYPERTENSION: ICD-10-CM

## 2019-11-28 DIAGNOSIS — Z98.89 OTHER SPECIFIED POSTPROCEDURAL STATES: Chronic | ICD-10-CM

## 2019-11-28 DIAGNOSIS — Z95.0 PRESENCE OF CARDIAC PACEMAKER: Chronic | ICD-10-CM

## 2019-11-28 DIAGNOSIS — I50.9 HEART FAILURE, UNSPECIFIED: ICD-10-CM

## 2019-11-28 DIAGNOSIS — Z29.9 ENCOUNTER FOR PROPHYLACTIC MEASURES, UNSPECIFIED: ICD-10-CM

## 2019-11-28 LAB
ALBUMIN SERPL ELPH-MCNC: 3.2 G/DL — LOW (ref 3.5–5)
ALP SERPL-CCNC: 66 U/L — SIGNIFICANT CHANGE UP (ref 40–120)
ALT FLD-CCNC: 17 U/L DA — SIGNIFICANT CHANGE UP (ref 10–60)
ANION GAP SERPL CALC-SCNC: 8 MMOL/L — SIGNIFICANT CHANGE UP (ref 5–17)
APTT BLD: 35.4 SEC — SIGNIFICANT CHANGE UP (ref 27.5–36.3)
AST SERPL-CCNC: 18 U/L — SIGNIFICANT CHANGE UP (ref 10–40)
BASOPHILS # BLD AUTO: 0.1 K/UL — SIGNIFICANT CHANGE UP (ref 0–0.2)
BASOPHILS NFR BLD AUTO: 0.9 % — SIGNIFICANT CHANGE UP (ref 0–2)
BILIRUB SERPL-MCNC: 0.5 MG/DL — SIGNIFICANT CHANGE UP (ref 0.2–1.2)
BLD GP AB SCN SERPL QL: SIGNIFICANT CHANGE UP
BUN SERPL-MCNC: 47 MG/DL — HIGH (ref 7–18)
CALCIUM SERPL-MCNC: 8.6 MG/DL — SIGNIFICANT CHANGE UP (ref 8.4–10.5)
CHLORIDE SERPL-SCNC: 110 MMOL/L — HIGH (ref 96–108)
CO2 SERPL-SCNC: 24 MMOL/L — SIGNIFICANT CHANGE UP (ref 22–31)
CREAT SERPL-MCNC: 1.71 MG/DL — HIGH (ref 0.5–1.3)
EOSINOPHIL # BLD AUTO: 0.31 K/UL — SIGNIFICANT CHANGE UP (ref 0–0.5)
EOSINOPHIL NFR BLD AUTO: 2.9 % — SIGNIFICANT CHANGE UP (ref 0–6)
GLUCOSE SERPL-MCNC: 120 MG/DL — HIGH (ref 70–99)
HCT VFR BLD CALC: 36.5 % — SIGNIFICANT CHANGE UP (ref 34.5–45)
HGB BLD-MCNC: 11.6 G/DL — SIGNIFICANT CHANGE UP (ref 11.5–15.5)
IMM GRANULOCYTES NFR BLD AUTO: 0.8 % — SIGNIFICANT CHANGE UP (ref 0–1.5)
INR BLD: 1.04 RATIO — SIGNIFICANT CHANGE UP (ref 0.88–1.16)
LYMPHOCYTES # BLD AUTO: 1.38 K/UL — SIGNIFICANT CHANGE UP (ref 1–3.3)
LYMPHOCYTES # BLD AUTO: 12.9 % — LOW (ref 13–44)
MCHC RBC-ENTMCNC: 31.1 PG — SIGNIFICANT CHANGE UP (ref 27–34)
MCHC RBC-ENTMCNC: 31.8 GM/DL — LOW (ref 32–36)
MCV RBC AUTO: 97.9 FL — SIGNIFICANT CHANGE UP (ref 80–100)
MONOCYTES # BLD AUTO: 0.8 K/UL — SIGNIFICANT CHANGE UP (ref 0–0.9)
MONOCYTES NFR BLD AUTO: 7.5 % — SIGNIFICANT CHANGE UP (ref 2–14)
NEUTROPHILS # BLD AUTO: 7.98 K/UL — HIGH (ref 1.8–7.4)
NEUTROPHILS NFR BLD AUTO: 75 % — SIGNIFICANT CHANGE UP (ref 43–77)
NRBC # BLD: 0 /100 WBCS — SIGNIFICANT CHANGE UP (ref 0–0)
PLATELET # BLD AUTO: 252 K/UL — SIGNIFICANT CHANGE UP (ref 150–400)
POTASSIUM SERPL-MCNC: 4.2 MMOL/L — SIGNIFICANT CHANGE UP (ref 3.5–5.3)
POTASSIUM SERPL-SCNC: 4.2 MMOL/L — SIGNIFICANT CHANGE UP (ref 3.5–5.3)
PROT SERPL-MCNC: 6.8 G/DL — SIGNIFICANT CHANGE UP (ref 6–8.3)
PROTHROM AB SERPL-ACNC: 11.6 SEC — SIGNIFICANT CHANGE UP (ref 10–12.9)
RBC # BLD: 3.73 M/UL — LOW (ref 3.8–5.2)
RBC # FLD: 13.1 % — SIGNIFICANT CHANGE UP (ref 10.3–14.5)
SODIUM SERPL-SCNC: 142 MMOL/L — SIGNIFICANT CHANGE UP (ref 135–145)
TROPONIN I SERPL-MCNC: 0.04 NG/ML — SIGNIFICANT CHANGE UP (ref 0–0.04)
WBC # BLD: 10.66 K/UL — HIGH (ref 3.8–10.5)
WBC # FLD AUTO: 10.66 K/UL — HIGH (ref 3.8–10.5)

## 2019-11-28 PROCEDURE — 73610 X-RAY EXAM OF ANKLE: CPT | Mod: 26,RT

## 2019-11-28 PROCEDURE — 71045 X-RAY EXAM CHEST 1 VIEW: CPT | Mod: 26

## 2019-11-28 PROCEDURE — 99285 EMERGENCY DEPT VISIT HI MDM: CPT

## 2019-11-28 PROCEDURE — 73590 X-RAY EXAM OF LOWER LEG: CPT | Mod: 26,RT

## 2019-11-28 RX ORDER — CARVEDILOL PHOSPHATE 80 MG/1
3.12 CAPSULE, EXTENDED RELEASE ORAL DAILY
Refills: 0 | Status: DISCONTINUED | OUTPATIENT
Start: 2019-11-28 | End: 2019-12-01

## 2019-11-28 RX ORDER — CARVEDILOL PHOSPHATE 80 MG/1
3.12 CAPSULE, EXTENDED RELEASE ORAL
Refills: 0 | Status: DISCONTINUED | OUTPATIENT
Start: 2019-11-28 | End: 2019-11-28

## 2019-11-28 RX ORDER — PANTOPRAZOLE SODIUM 20 MG/1
40 TABLET, DELAYED RELEASE ORAL
Refills: 0 | Status: DISCONTINUED | OUTPATIENT
Start: 2019-11-28 | End: 2019-11-28

## 2019-11-28 RX ORDER — ACETAMINOPHEN 500 MG
2 TABLET ORAL
Qty: 0 | Refills: 0 | DISCHARGE

## 2019-11-28 RX ORDER — SODIUM CHLORIDE 9 MG/ML
1000 INJECTION INTRAMUSCULAR; INTRAVENOUS; SUBCUTANEOUS
Refills: 0 | Status: DISCONTINUED | OUTPATIENT
Start: 2019-11-28 | End: 2019-11-29

## 2019-11-28 RX ORDER — CARVEDILOL PHOSPHATE 80 MG/1
6.25 CAPSULE, EXTENDED RELEASE ORAL
Refills: 0 | Status: DISCONTINUED | OUTPATIENT
Start: 2019-11-28 | End: 2019-11-28

## 2019-11-28 RX ORDER — FAMOTIDINE 10 MG/ML
1 INJECTION INTRAVENOUS
Qty: 0 | Refills: 0 | DISCHARGE

## 2019-11-28 RX ORDER — ACETAMINOPHEN 500 MG
650 TABLET ORAL EVERY 6 HOURS
Refills: 0 | Status: DISCONTINUED | OUTPATIENT
Start: 2019-11-28 | End: 2019-12-02

## 2019-11-28 RX ORDER — CARVEDILOL PHOSPHATE 80 MG/1
6.25 CAPSULE, EXTENDED RELEASE ORAL ONCE
Refills: 0 | Status: COMPLETED | OUTPATIENT
Start: 2019-11-28 | End: 2019-11-28

## 2019-11-28 RX ORDER — FAMOTIDINE 10 MG/ML
20 INJECTION INTRAVENOUS DAILY
Refills: 0 | Status: DISCONTINUED | OUTPATIENT
Start: 2019-11-28 | End: 2019-12-02

## 2019-11-28 RX ORDER — ATORVASTATIN CALCIUM 80 MG/1
20 TABLET, FILM COATED ORAL AT BEDTIME
Refills: 0 | Status: DISCONTINUED | OUTPATIENT
Start: 2019-11-28 | End: 2019-11-28

## 2019-11-28 RX ORDER — OMEPRAZOLE 10 MG/1
1 CAPSULE, DELAYED RELEASE ORAL
Qty: 0 | Refills: 0 | DISCHARGE

## 2019-11-28 RX ORDER — CARVEDILOL PHOSPHATE 80 MG/1
6.25 CAPSULE, EXTENDED RELEASE ORAL AT BEDTIME
Refills: 0 | Status: DISCONTINUED | OUTPATIENT
Start: 2019-11-28 | End: 2019-12-01

## 2019-11-28 RX ORDER — SERTRALINE 25 MG/1
1 TABLET, FILM COATED ORAL
Qty: 0 | Refills: 0 | DISCHARGE

## 2019-11-28 RX ORDER — ATORVASTATIN CALCIUM 80 MG/1
20 TABLET, FILM COATED ORAL DAILY
Refills: 0 | Status: DISCONTINUED | OUTPATIENT
Start: 2019-11-28 | End: 2019-12-02

## 2019-11-28 RX ORDER — ACETAMINOPHEN 500 MG
650 TABLET ORAL ONCE
Refills: 0 | Status: COMPLETED | OUTPATIENT
Start: 2019-11-28 | End: 2019-11-28

## 2019-11-28 RX ORDER — HEPARIN SODIUM 5000 [USP'U]/ML
5000 INJECTION INTRAVENOUS; SUBCUTANEOUS EVERY 12 HOURS
Refills: 0 | Status: DISCONTINUED | OUTPATIENT
Start: 2019-11-28 | End: 2019-12-02

## 2019-11-28 RX ORDER — SERTRALINE 25 MG/1
25 TABLET, FILM COATED ORAL AT BEDTIME
Refills: 0 | Status: DISCONTINUED | OUTPATIENT
Start: 2019-11-28 | End: 2019-12-02

## 2019-11-28 RX ORDER — CHOLECALCIFEROL (VITAMIN D3) 125 MCG
1 CAPSULE ORAL
Qty: 0 | Refills: 0 | DISCHARGE

## 2019-11-28 RX ORDER — LUTEIN 20 MG
1 CAPSULE ORAL
Qty: 0 | Refills: 0 | DISCHARGE

## 2019-11-28 RX ORDER — ASPIRIN/CALCIUM CARB/MAGNESIUM 324 MG
81 TABLET ORAL DAILY
Refills: 0 | Status: DISCONTINUED | OUTPATIENT
Start: 2019-11-28 | End: 2019-12-02

## 2019-11-28 RX ORDER — EZETIMIBE 10 MG/1
1 TABLET ORAL
Qty: 0 | Refills: 0 | DISCHARGE

## 2019-11-28 RX ADMIN — SODIUM CHLORIDE 60 MILLILITER(S): 9 INJECTION INTRAMUSCULAR; INTRAVENOUS; SUBCUTANEOUS at 21:39

## 2019-11-28 RX ADMIN — Medication 650 MILLIGRAM(S): at 15:47

## 2019-11-28 RX ADMIN — Medication 650 MILLIGRAM(S): at 16:17

## 2019-11-28 RX ADMIN — CARVEDILOL PHOSPHATE 6.25 MILLIGRAM(S): 80 CAPSULE, EXTENDED RELEASE ORAL at 23:37

## 2019-11-28 RX ADMIN — SERTRALINE 25 MILLIGRAM(S): 25 TABLET, FILM COATED ORAL at 23:52

## 2019-11-28 NOTE — ED PROVIDER NOTE - CLINICAL SUMMARY MEDICAL DECISION MAKING FREE TEXT BOX
Patient presenting to the ED complaining of right lower leg pain s/p fall. Will obtain X-ray. Concerned for fracture.

## 2019-11-28 NOTE — ED PROCEDURE NOTE - CPROC ED POST PROC CARE GUIDE1
Instructed patient/caregiver to follow-up with primary care physician./Instructed patient/caregiver regarding signs and symptoms of infection./Verbal/written post procedure instructions were given to patient/caregiver./Keep the cast/splint/dressing clean and dry./Elevate the injured extremity as instructed.

## 2019-11-28 NOTE — ED PROVIDER NOTE - CONSTITUTIONAL, MLM
normal... Well appearing, awake, alert, oriented to person, place, time/situation and in no apparent distress. - - -

## 2019-11-28 NOTE — H&P ADULT - PROBLEM SELECTOR PLAN 2
She sat suddenly while standing.   Does not remember exactly what made her sit. denies any symptoms prior to fall.  Orthostatic .  EKG showed pacing with wide qrs  Admitted to tele, pt has h/o PAF, AS which are risk factors  trop 1 negative  Cardiologist Dr Lin consulted She sat suddenly while standing.   EKG showed pacing with wide qrs  Admitted to tele, pt has h/o PAF, AS which are risk factors  trop 1 negative  Cardiologist Dr Lin consulted

## 2019-11-28 NOTE — H&P ADULT - HISTORY OF PRESENT ILLNESS
89 Yo F with PMH of HTn, HLD, Paroxysmal Afib, [place] scientific PPM (unknown when it was placed), Hip fracture s/p surgical correction, CHF (unknown EF) came with pain in the left leg after she had a fall at home. She lives alone with HHA 24x7 at home and walks with a walker. Aid was helping her with the dress while she suddenly sat down. She denies lighthededness, weakness, dizziness, blurry vision, loss of balance, palpitations before the fall. States that while sitting down her leg tripped and she felt pain. According to son, she complained pain when EMS was placing her on the stretcher. He also states that aid has not been taking good care of her and has been having few incidents for the past few months. Recently she hit her R great toe and injured it while walking at home. She visits her cardiologist and PCP frequently and everything has been well. 89 Yo F with PMH of HTn, HLD, Paroxysmal Afib, CHB s/p PPM (2010 [employer] PPM), Hip fracture s/p surgical correction, CHF (unknown EF) came with pain in the left leg after she had a fall at home. She lives alone with HHA 24x7 at home and walks with a walker. Aid was helping her with the dress while she suddenly sat down. She denies lightheadedness weakness, dizziness, blurry vision, loss of balance, palpitations before the fall. States that while sitting down her leg tripped and she felt pain. According to son, she complained pain when EMS was placing her on the stretcher. He also states that aid has not been taking good care of her and has been having few incidents for the past few months. Recently she hit her R great toe and injured it while walking at home. She visits her cardiologist and PCP frequently and everything has been well. Patient is mildly demented and her h/o is not completely reliable. 89 Yo F with PMH of HTn, HLD, Paroxysmal Afib, CHB s/p PPM (2010 [employer] PPM), Hip fracture s/p surgical correction, CHF (unknown EF) came with pain in the R leg after she had a fall at home. She lives alone with HHA 24x7 at home and walks with a walker. Aid was helping her with the dress while she suddenly sat down. She denies lightheadedness weakness, dizziness, blurry vision, loss of balance, palpitations before the fall. States that while sitting down her leg tripped and she felt pain. According to son, she complained pain when EMS was placing her on the stretcher. He also states that aid has not been taking good care of her and has been having few incidents for the past few months. Recently she hit her L great toe and injured it while walking at home. She visits her cardiologist and PCP frequently and everything has been well. Patient is mildly demented and her h/o is not completely reliable.

## 2019-11-28 NOTE — H&P ADULT - PROBLEM SELECTOR PLAN 6
Has h/o HTn   takes  monitor bp Has h/o HTn   takes coreg 3.125 morning and 6.25 evening, c/w home dose  monitor bp Has h/o HTN  takes coreg 3.125 morning and 6.25 evening, c/w home dose  monitor bp

## 2019-11-28 NOTE — ED ADULT NURSE NOTE - NSIMPLEMENTINTERV_GEN_ALL_ED
Implemented All Fall with Harm Risk Interventions:  [place] to call system. Call bell, personal items and telephone within reach. Instruct patient to call for assistance. Room bathroom lighting operational. Non-slip footwear when patient is off stretcher. Physically safe environment: no spills, clutter or unnecessary equipment. Stretcher in lowest position, wheels locked, appropriate side rails in place. Provide visual cue, wrist band, yellow gown, etc. Monitor gait and stability. Monitor for mental status changes and reorient to person, place, and time. Review medications for side effects contributing to fall risk. Reinforce activity limits and safety measures with patient and family. Provide visual clues: red socks.

## 2019-11-28 NOTE — ED ADULT NURSE NOTE - OBJECTIVE STATEMENT
RN AICHA SY COVERING NOTES: AOX4 +ambulatory patient reports she was getting dressed and her R foot give out and twisted her ankle. Patient noted swelling and bruising on R medial ankle. Patient denies any dizziness or loc. +pulses

## 2019-11-28 NOTE — H&P ADULT - ASSESSMENT
89 Yo F with PMH of HTn, HLD, Paroxysmal Afib, [place] scientific PPM (unknown when it was placed), Hip fracture s/p surgical correction, CHF (unknown EF) came with pain in the left leg after she had a fall at home.         XR showed mid shaft fracture of the left tibia.  She is being admitted for mid shaft fracture of left tibia.  She is being admitte to tele for afib and ?possible cause of syncope. 89 Yo F with PMH of HTn, HLD, Paroxysmal Afib, [place] scientific PPM (unknown when it was placed), Hip fracture s/p surgical correction, CHF (unknown EF) came with pain in the right leg after she had a fall at home.         XR showed mid shaft fracture of the R tibia.  She is being admitted for mid shaft fracture of R tibia.  She is being admitte to tele for afib and ?possible cause of syncope.  Mildly elevated WBC-reactive leucocytosis

## 2019-11-28 NOTE — ED PROVIDER NOTE - NO SIGNIFICANT PAST SURGICAL HISTORY
<<----- Click to add NO significant Past Surgical History Location Indication Override (Is Already Calculated Based On Selected Body Location): Area H

## 2019-11-28 NOTE — H&P ADULT - NSICDXPASTSURGICALHX_GEN_ALL_CORE_FT
PAST SURGICAL HISTORY:  Elective surgery excision of thyroid nodule    History of cardiac pacemaker 2010    No significant past surgical history     Status post pericardiocentesis 5/18/2016

## 2019-11-28 NOTE — H&P ADULT - PROBLEM SELECTOR PLAN 1
P/W pain in the left leg s/p fall.  She denies any symptoms prior to fall. Has pain of 3/10 in the left leg, relieved with tylenol.  Denies numbness, tingling. O/E pulses intact, able to move toes, sensation intact.  H/H stable, no signs of bleed near fracture site.  XR showed complete fracture of mid shaft of left tibia.   Orthopedist was consulted.  Pain medication: Tylenol prn as she has mild pain.  Can give Morphine if pain increases, avoid NSAIDs as pt has NEELAM. P/W pain in the R leg s/p fall.  She denies any symptoms prior to fall. Has pain of 3/10 in the left leg, relieved with tylenol.  Denies numbness, tingling. O/E pulses intact, able to move toes, sensation intact.  H/H stable, no signs of bleed near fracture site.  XR showed complete fracture of mid shaft of R tibia.   Orthopedist was consulted.  Pain medication: Tylenol prn as she has mild pain.  Can give Morphine if pain increases, avoid NSAIDs as pt has NEELAM. P/W pain in the R leg s/p fall.  She denies any symptoms prior to fall. Has pain of 3/10 in the right leg, relieved with tylenol.  Denies numbness, tingling. O/E pulses intact, able to move toes, sensation intact.  H/H stable, no signs of bleed near fracture site.  XR showed complete fracture of mid shaft of R tibia.   Unable to reach ortho for consult, surgery H/O Dr Ramos contacted, will relay consult to ortho on call.   Pain medication: Tylenol prn as she has mild pain.  Can give Morphine if pain increases, avoid NSAIDs as pt has NEELAM.

## 2019-11-28 NOTE — H&P ADULT - PROBLEM SELECTOR PLAN 4
Pt has CKD, GFR was 37 on previous discharge, baseline cr 1.28  On presentation Cr was 1.7  mild hydration   f/u BMp

## 2019-11-28 NOTE — ED PROVIDER NOTE - OBJECTIVE STATEMENT
89 y/o F patient with a significant PMHx of HLD, HTN, PAF, complete heart block and with no significant PSHx presents to the ED s/p fall. Patient is complaining of right lower leg pain. Patient states she lives with aide. Patient was standing up and while aide was fixing her dress when she lost her balance and sank slowly to the floor. Patient relates that her pain is on a level of 2 and states she feels fine. Patient is currently on Carvedilol and Furosamide among other medications. Patient denies any dizziness, cough, or any other complaints.

## 2019-11-28 NOTE — H&P ADULT - NSICDXPASTMEDICALHX_GEN_ALL_CORE_FT
PAST MEDICAL HISTORY:  Complete heart block 2010    Endogenous hyperlipemia     Essential hypertension     Hypertension     PAF (paroxysmal atrial fibrillation) 1/7/2016    Thyroid nodule

## 2019-11-28 NOTE — H&P ADULT - MUSCULOSKELETAL
details… detailed exam no joint erythema/no joint swelling/ROM intact no joint swelling/no joint erythema/calf tenderness

## 2019-11-28 NOTE — H&P ADULT - NSHPPHYSICALEXAM_GEN_ALL_CORE
Vital Signs (24 Hrs):  T(C): 36.8 (11-28-19 @ 19:50), Max: 36.8 (11-28-19 @ 19:50)  HR: 83 (11-28-19 @ 19:50) (75 - 83)  BP: 142/77 (11-28-19 @ 19:50) (142/77 - 193/78)  RR: 17 (11-28-19 @ 19:50) (16 - 17)  SpO2: 95% (11-28-19 @ 19:50) (95% - 98%)

## 2019-11-28 NOTE — H&P ADULT - PROBLEM SELECTOR PLAN 5
According to son pt has CHF, has been seeing Cardiologist who has been treating her with lasix and coreg  Hold lasix as of now in setting of [place]  c/w coreg  f/u ECHO

## 2019-11-28 NOTE — H&P ADULT - PROBLEM SELECTOR PLAN 7
IMPROVE VTE Individual Risk Assessment    RISK                                                                Points  [  ] Previous VTE                                                  3  [  ] Thrombophilia                                               2  [  ] Lower limb paralysis                                      2        (unable to hold up >15 seconds)    [  ] Current Cancer                                              2         (within 6 months)  [1  ] Immobilization > 24 hrs                                1  [ ] ICU/CCU stay > 24 hours                              1  [1  ] Age > 60                                                      1  IMPROVE VTE Score _____2____  IMPROVE Score 2-3: At risk, pharmacologic VTE prophylaxis is indicated for most patients (in the absence of a contraindication)  starting on heparin sub q

## 2019-11-28 NOTE — ED PROVIDER NOTE - PROGRESS NOTE DETAILS
pt with mid shaft tib fracture, attempted to call Dr. Jackson numerous times, no answer, case d/w Dr. Sung, will admit.  pt is unstable to be d/c home

## 2019-11-28 NOTE — ED PROVIDER NOTE - MUSCULOSKELETAL, MLM
Spine appears normal, range of motion is not limited, no muscle or joint tenderness Spine appears normal, range of motion is not limited, RLE-distal leg with mild deformity, no calf tenderness, sensory/pulse intact, RLE>LLE chronic

## 2019-11-28 NOTE — H&P ADULT - PROBLEM SELECTOR PLAN 3
Pt has h/o PAfib, rate controlled. not on AC.  has [employer] scientific PPm.   EKG showed wide QRS complex Pt has h/o PAfib,  not on AC.  has [place] scientific PPm.   EKG showed pacing with wide QRS complex  admitted to tele  cardiologist Dr balbuena Pt has h/o PAfib,  not on AC.  has [place] scientific PPM.   EKG showed pacing with wide QRS complex  admitted to tele  cardiologist Dr balbuena

## 2019-11-28 NOTE — H&P ADULT - PROBLEM SELECTOR PROBLEM 7
(2) assistive person (3) assistive equipment and person (1) assistive equipment Prophylactic measure

## 2019-11-29 LAB
24R-OH-CALCIDIOL SERPL-MCNC: 43.3 NG/ML — SIGNIFICANT CHANGE UP (ref 30–80)
ALBUMIN SERPL ELPH-MCNC: 3 G/DL — LOW (ref 3.5–5)
ALP SERPL-CCNC: 59 U/L — SIGNIFICANT CHANGE UP (ref 40–120)
ALT FLD-CCNC: 16 U/L DA — SIGNIFICANT CHANGE UP (ref 10–60)
ANION GAP SERPL CALC-SCNC: 5 MMOL/L — SIGNIFICANT CHANGE UP (ref 5–17)
AST SERPL-CCNC: 13 U/L — SIGNIFICANT CHANGE UP (ref 10–40)
BASOPHILS # BLD AUTO: 0.09 K/UL — SIGNIFICANT CHANGE UP (ref 0–0.2)
BASOPHILS NFR BLD AUTO: 1.2 % — SIGNIFICANT CHANGE UP (ref 0–2)
BILIRUB SERPL-MCNC: 0.4 MG/DL — SIGNIFICANT CHANGE UP (ref 0.2–1.2)
BUN SERPL-MCNC: 43 MG/DL — HIGH (ref 7–18)
CALCIUM SERPL-MCNC: 8.4 MG/DL — SIGNIFICANT CHANGE UP (ref 8.4–10.5)
CHLORIDE SERPL-SCNC: 111 MMOL/L — HIGH (ref 96–108)
CHOLEST SERPL-MCNC: 183 MG/DL — SIGNIFICANT CHANGE UP (ref 10–199)
CO2 SERPL-SCNC: 26 MMOL/L — SIGNIFICANT CHANGE UP (ref 22–31)
CREAT SERPL-MCNC: 1.79 MG/DL — HIGH (ref 0.5–1.3)
EOSINOPHIL # BLD AUTO: 0.38 K/UL — SIGNIFICANT CHANGE UP (ref 0–0.5)
EOSINOPHIL NFR BLD AUTO: 5.2 % — SIGNIFICANT CHANGE UP (ref 0–6)
ESTIMATED AVERAGE GLUCOSE: 108 MG/DL — SIGNIFICANT CHANGE UP (ref 68–114)
FERRITIN SERPL-MCNC: 274 NG/ML — HIGH (ref 15–150)
FOLATE SERPL-MCNC: >20 NG/ML — SIGNIFICANT CHANGE UP
GLUCOSE SERPL-MCNC: 110 MG/DL — HIGH (ref 70–99)
HBA1C BLD-MCNC: 5.4 % — SIGNIFICANT CHANGE UP (ref 4–5.6)
HCT VFR BLD CALC: 34.1 % — LOW (ref 34.5–45)
HDLC SERPL-MCNC: 39 MG/DL — LOW
HGB BLD-MCNC: 10.8 G/DL — LOW (ref 11.5–15.5)
IMM GRANULOCYTES NFR BLD AUTO: 1 % — SIGNIFICANT CHANGE UP (ref 0–1.5)
IRON SATN MFR SERPL: 27 UG/DL — LOW (ref 40–150)
IRON SATN MFR SERPL: 9 % — LOW (ref 15–50)
LIPID PNL WITH DIRECT LDL SERPL: 82 MG/DL — SIGNIFICANT CHANGE UP
LYMPHOCYTES # BLD AUTO: 1.12 K/UL — SIGNIFICANT CHANGE UP (ref 1–3.3)
LYMPHOCYTES # BLD AUTO: 15.5 % — SIGNIFICANT CHANGE UP (ref 13–44)
MAGNESIUM SERPL-MCNC: 2.5 MG/DL — SIGNIFICANT CHANGE UP (ref 1.6–2.6)
MCHC RBC-ENTMCNC: 30.7 PG — SIGNIFICANT CHANGE UP (ref 27–34)
MCHC RBC-ENTMCNC: 31.7 GM/DL — LOW (ref 32–36)
MCV RBC AUTO: 96.9 FL — SIGNIFICANT CHANGE UP (ref 80–100)
MONOCYTES # BLD AUTO: 0.68 K/UL — SIGNIFICANT CHANGE UP (ref 0–0.9)
MONOCYTES NFR BLD AUTO: 9.4 % — SIGNIFICANT CHANGE UP (ref 2–14)
NEUTROPHILS # BLD AUTO: 4.9 K/UL — SIGNIFICANT CHANGE UP (ref 1.8–7.4)
NEUTROPHILS NFR BLD AUTO: 67.7 % — SIGNIFICANT CHANGE UP (ref 43–77)
NRBC # BLD: 0 /100 WBCS — SIGNIFICANT CHANGE UP (ref 0–0)
PHOSPHATE SERPL-MCNC: 3.3 MG/DL — SIGNIFICANT CHANGE UP (ref 2.5–4.5)
PLATELET # BLD AUTO: 243 K/UL — SIGNIFICANT CHANGE UP (ref 150–400)
POTASSIUM SERPL-MCNC: 4.2 MMOL/L — SIGNIFICANT CHANGE UP (ref 3.5–5.3)
POTASSIUM SERPL-SCNC: 4.2 MMOL/L — SIGNIFICANT CHANGE UP (ref 3.5–5.3)
PROT SERPL-MCNC: 6.3 G/DL — SIGNIFICANT CHANGE UP (ref 6–8.3)
RBC # BLD: 3.52 M/UL — LOW (ref 3.8–5.2)
RBC # FLD: 13.5 % — SIGNIFICANT CHANGE UP (ref 10.3–14.5)
SODIUM SERPL-SCNC: 142 MMOL/L — SIGNIFICANT CHANGE UP (ref 135–145)
T4 FREE SERPL-MCNC: 0.8 NG/DL — LOW (ref 0.9–1.8)
TIBC SERPL-MCNC: 297 UG/DL — SIGNIFICANT CHANGE UP (ref 250–450)
TOTAL CHOLESTEROL/HDL RATIO MEASUREMENT: 4.7 RATIO — SIGNIFICANT CHANGE UP (ref 3.3–7.1)
TRANSFERRIN SERPL-MCNC: 244 MG/DL — SIGNIFICANT CHANGE UP (ref 200–360)
TRIGL SERPL-MCNC: 310 MG/DL — HIGH (ref 10–149)
TROPONIN I SERPL-MCNC: 0.03 NG/ML — SIGNIFICANT CHANGE UP (ref 0–0.04)
TSH SERPL-MCNC: 5.2 UU/ML — HIGH (ref 0.34–4.82)
UIBC SERPL-MCNC: 270 UG/DL — SIGNIFICANT CHANGE UP (ref 110–370)
URATE SERPL-MCNC: 11.7 MG/DL — HIGH (ref 2.5–7)
VIT B12 SERPL-MCNC: 472 PG/ML — SIGNIFICANT CHANGE UP (ref 232–1245)
WBC # BLD: 7.24 K/UL — SIGNIFICANT CHANGE UP (ref 3.8–10.5)
WBC # FLD AUTO: 7.24 K/UL — SIGNIFICANT CHANGE UP (ref 3.8–10.5)

## 2019-11-29 RX ORDER — ALLOPURINOL 300 MG
100 TABLET ORAL DAILY
Refills: 0 | Status: DISCONTINUED | OUTPATIENT
Start: 2019-11-29 | End: 2019-12-02

## 2019-11-29 RX ORDER — FERROUS SULFATE 325(65) MG
325 TABLET ORAL DAILY
Refills: 0 | Status: DISCONTINUED | OUTPATIENT
Start: 2019-11-29 | End: 2019-11-30

## 2019-11-29 RX ADMIN — HEPARIN SODIUM 5000 UNIT(S): 5000 INJECTION INTRAVENOUS; SUBCUTANEOUS at 05:45

## 2019-11-29 RX ADMIN — CARVEDILOL PHOSPHATE 3.12 MILLIGRAM(S): 80 CAPSULE, EXTENDED RELEASE ORAL at 05:46

## 2019-11-29 RX ADMIN — SERTRALINE 25 MILLIGRAM(S): 25 TABLET, FILM COATED ORAL at 21:19

## 2019-11-29 RX ADMIN — HEPARIN SODIUM 5000 UNIT(S): 5000 INJECTION INTRAVENOUS; SUBCUTANEOUS at 17:15

## 2019-11-29 RX ADMIN — Medication 100 MILLIGRAM(S): at 11:12

## 2019-11-29 RX ADMIN — ATORVASTATIN CALCIUM 20 MILLIGRAM(S): 80 TABLET, FILM COATED ORAL at 11:12

## 2019-11-29 RX ADMIN — FAMOTIDINE 20 MILLIGRAM(S): 10 INJECTION INTRAVENOUS at 11:12

## 2019-11-29 RX ADMIN — CARVEDILOL PHOSPHATE 6.25 MILLIGRAM(S): 80 CAPSULE, EXTENDED RELEASE ORAL at 21:19

## 2019-11-29 RX ADMIN — Medication 81 MILLIGRAM(S): at 11:12

## 2019-11-29 NOTE — CONSULT NOTE ADULT - ATTENDING COMMENTS
pt identified, agree with above.  displaced tib fib fracture.  treat with closed reduction with manipulation / splint placement for better alignment and conrtinued fracture treatment / fracture care.  pt is poor surgical candidate/ high risk,  discussed with family.  nwb, pain control, dvt prophylaxis, splint.

## 2019-11-29 NOTE — CONSULT NOTE ADULT - ASSESSMENT
89 Yo F with PMH of HTn, HLD, Paroxysmal Afib, CHB s/p PPM (2010 [employer] PPM), Hip fracture s/p surgical correction, CHF (unknown EF) came with pain in the R leg after she had a fall at home,NEELAM, Rt leg fx.  1.Tele monitoring.  2.Echocardiogram.  3.Check PPM.  4.CRI-renal eval, d/c IVF,SPEP.  5.PAF-asa,coreg.  6.Elevated uric acid-allopurinol.  7.Rt leg fx-Ortho eval called.  8.Elevated TSH, check free t4.  9.Check anemia profile.

## 2019-11-29 NOTE — CONSULT NOTE ADULT - SUBJECTIVE AND OBJECTIVE BOX
Chief complain / HPI  Patient is podt fall and tibial fx right, admitted yesterday after the fall  She was standing up and became weak and failed down    Known history of AS  On diuretics.,   CKD, Creatinine, Serum: 1.28 mg/dL (10.15.18 @ 07:07)    PAST MEDICAL & SURGICAL HISTORY:  Essential hypertension  Hypertension  Endogenous hyperlipemia  PAF (paroxysmal atrial fibrillation): 1/7/2016  Thyroid nodule  Complete heart block: 2010  No significant past surgical history  Status post pericardiocentesis: 5/18/2016  Elective surgery: excision of thyroid nodule  History of cardiac pacemaker: 2010      Home Medications Reviewed    Hospital Medications:   MEDICATIONS  (STANDING):  allopurinol 100 milliGRAM(s) Oral daily  aspirin  chewable 81 milliGRAM(s) Oral daily  atorvastatin 20 milliGRAM(s) Oral daily  carvedilol 3.125 milliGRAM(s) Oral daily  carvedilol 6.25 milliGRAM(s) Oral at bedtime  famotidine    Tablet 20 milliGRAM(s) Oral daily  ferrous    sulfate 325 milliGRAM(s) Oral daily  heparin  Injectable 5000 Unit(s) SubCutaneous every 12 hours  sertraline 25 milliGRAM(s) Oral at bedtime    MEDICATIONS  (PRN):  acetaminophen   Tablet .. 650 milliGRAM(s) Oral every 6 hours PRN Mild Pain (1 - 3), Moderate Pain (4 - 6)      Allergies    No Known Allergies    Intolerances                              10.8   7.24  )-----------( 243      ( 29 Nov 2019 06:59 )             34.1     11-29    142  |  111<H>  |  43<H>  ----------------------------<  110<H>  4.2   |  26  |  1.79<H>    Ca    8.4      29 Nov 2019 06:59  Phos  3.3     11-29  Mg     2.5     11-29    TPro  6.3  /  Alb  3.0<L>  /  TBili  0.4  /  DBili  x   /  AST  13  /  ALT  16  /  AlkPhos  59  11-29    PT/INR - ( 28 Nov 2019 17:16 )   PT: 11.6 sec;   INR: 1.04 ratio         PTT - ( 28 Nov 2019 17:16 )  PTT:35.4 sec      planning.    TECHNIQUE: Single view of the chest.    COMPARISON: Chest radiograph 10/10/2018.    FINDINGS:     There is a left-sided remainder with leads overlying the right atrium and   right ventricle. There are low lung volumes.There is no focal   consolidation. There is no pleural effusion or pneumothorax. The cardiac   silhouette is not well evaluated on this projection.    IMPRESSION:     Low lung volumes. No focal consolidation.        RADIOLOGY & ADDITIONAL STUDIES:  CONCLUSIONS:  1. Mitral annular calcification, and calcified mitral  leaflets with normal diastolic opening. Mild mitral  regurgitation.  2. Calcified trileaflet aortic valve with decreased  opening. Peak transaortic valve gradient equals 51.8 mm Hg,  mean transaortic valve gradient equals 30 mm Hg, estimated  aortic valve area equals 0.9 sqcm (by continuity equation),  consistent with severe aortic stenosis.The dimensionless  index is .30.  3. Aortic Root: 3.1 cm.    4. Normal left atrium.  LA volume index = 19 cc/m2.  5. Normal left ventricular internal dimensions and wall  thicknesses.  6. Normal Left Ventricular Systolic Function,  (EF = 55%)  Peak leftventricular outflow tract gradient equals 4.8 mm  Hg.  7. Grade I diastolic dysfunction (Impaired relaxation).  8. Normal right atrium.  9. Normal right ventricular size and systolic function  (TAPSE 1.7 cm). A device lead is visualized in the right  heart.  10. RA Pressure is 8 mm Hg.  11. RV systolic pressure is moderately increased at  49 mm  Hg.  12. There is mild tricuspid regurgitation.  13. There is mild pulmonic regurgitation.  14. Normal pericardium with no pericardial effusion.    SOCIAL HISTORY: Denies ETOh,Smoking,     FAMILY HISTORY:  No pertinent family history in first degree relatives: cardiac disease      REVIEW OF SYSTEMS:  CONSTITUTIONAL: No malaise, No fatigue, No fevers or chills, well developed, no diaphoresis  EYES/ENT: No visual changes;  No vertigo or throat pain   NECK: No pain or stiffness  RESPIRATORY: No cough, wheezing, hemoptysis; No shortness of breath  CARDIOVASCULAR: No chest pain or palpitations. No edema  GASTROINTESTINAL: No abdominal or epigastric pain. No nausea, vomiting, or hematemesis; No diarrhea or constipation. No melena or hematochezia.  GENITOURINARY: No dysuria, frequency, foamy urine, urinary urgency, incontinence or hematuria  NEUROLOGICAL: No numbness or weakness, No tremor  No pain in lower extermity in rest    VITALS:  Vital Signs Last 24 Hrs  T(C): 36.7 (29 Nov 2019 11:26), Max: 36.9 (29 Nov 2019 04:40)  T(F): 98.1 (29 Nov 2019 11:26), Max: 98.4 (29 Nov 2019 04:40)  HR: 73 (29 Nov 2019 11:26) (67 - 83)  BP: 120/70 (29 Nov 2019 11:26) (120/70 - 169/61)  BP(mean): --  RR: 18 (29 Nov 2019 11:26) (17 - 18)  SpO2: 92% (29 Nov 2019 11:26) (92% - 97%)        PHYSICAL EXAM:  Constitutional: NAD  HEENT: anicteric sclera, oropharynx clear, MMM  Neck: No JVD  Respiratory: good air entrance B/L, no wheezes, rales or rhonchi  Cardiovascular: hursh ,crescendo decrescendo systolic m in aortic area 3/6.  S1, S2, RRR, no pericardial rub, no murmur  Gastrointestinal: BS+, soft, no tenderness, no distension, no bruit  Pelvis: bladder non-distended, no CVA tenderness  Extremities: No cyanosis or clubbing. No peripheral edema  Neurological: A/O x 3, no focal deficits

## 2019-11-29 NOTE — CONSULT NOTE ADULT - SUBJECTIVE AND OBJECTIVE BOX
CHIEF COMPLAINT:Patient is a 89y old  Female who presents with a chief complaint of Fall .      HPI:  89 Yo F with PMH of HTn, HLD, Paroxysmal Afib, CHB s/p PPM (2010 [employer] PPM), Hip fracture s/p surgical correction, CHF (unknown EF) came with pain in the R leg after she had a fall at home. She lives alone with HHA 24x7 at home and walks with a walker. Aid was helping her with the dress while she suddenly sat down. She denies lightheadedness weakness, dizziness, blurry vision, loss of balance, palpitations before the fall. States that while sitting down her leg tripped and she felt pain. According to son, she complained pain when EMS was placing her on the stretcher. He also states that aid has not been taking good care of her and has been having few incidents for the past few months. Recently she hit her L great toe and injured it while walking at home. She visits her cardiologist and PCP frequently and everything has been well. Patient is mildly demented and her h/o is not completely reliable. (28 Nov 2019 19:47)      PAST MEDICAL & SURGICAL HISTORY:  Essential hypertension  Hypertension  Endogenous hyperlipemia  PAF (paroxysmal atrial fibrillation): 1/7/2016  Thyroid nodule  Complete heart block: 2010  Status post pericardiocentesis: 5/18/2016  Elective surgery: excision of thyroid nodule  History of cardiac pacemaker: 2010      MEDICATIONS  (STANDING):  allopurinol 100 milliGRAM(s) Oral daily  aspirin  chewable 81 milliGRAM(s) Oral daily  atorvastatin 20 milliGRAM(s) Oral daily  carvedilol 3.125 milliGRAM(s) Oral daily  carvedilol 6.25 milliGRAM(s) Oral at bedtime  famotidine    Tablet 20 milliGRAM(s) Oral daily  heparin  Injectable 5000 Unit(s) SubCutaneous every 12 hours  sertraline 25 milliGRAM(s) Oral at bedtime    MEDICATIONS  (PRN):  acetaminophen   Tablet .. 650 milliGRAM(s) Oral every 6 hours PRN Mild Pain (1 - 3), Moderate Pain (4 - 6)      FAMILY HISTORY: No hx of CAD      SOCIAL HISTORY:    [x ] Non-smoker    [x ] Alcohol-denies    Allergies    No Known Allergies    Intolerances    	    REVIEW OF SYSTEMS:  CONSTITUTIONAL: No fever, weight loss, or fatigue  EYES: No eye pain, visual disturbances, or discharge  ENT:  No difficulty hearing, tinnitus, vertigo; No sinus or throat pain  NECK: No pain or stiffness  RESPIRATORY: No cough, wheezing, chills or hemoptysis; No Shortness of Breath  CARDIOVASCULAR: No chest pain, palpitations, passing out, dizziness, or leg swelling  GASTROINTESTINAL: No abdominal or epigastric pain. No nausea, vomiting, or hematemesis; No diarrhea or constipation. No melena or hematochezia.  GENITOURINARY: No dysuria, frequency, hematuria, or incontinence  NEUROLOGICAL: No headaches, memory loss, loss of strength, numbness, or tremors  SKIN: No itching, burning, rashes, or lesions   LYMPH Nodes: No enlarged glands  ENDOCRINE: No heat or cold intolerance; No hair loss  MUSCULOSKELETAL: No joint pain or swelling; No muscle, back, + extremity pain  PSYCHIATRIC: No depression, anxiety, mood swings, or difficulty sleeping  HEME/LYMPH: No easy bruising, or bleeding gums  ALLERGY AND IMMUNOLOGIC: No hives or eczema	      PHYSICAL EXAM:  T(C): 36.7 (11-29-19 @ 07:05), Max: 36.9 (11-29-19 @ 04:40)  HR: 67 (11-29-19 @ 07:05) (67 - 83)  BP: 139/52 (11-29-19 @ 07:05) (139/52 - 193/78)  RR: 18 (11-29-19 @ 07:05) (16 - 18)  SpO2: 97% (11-29-19 @ 07:05) (95% - 98%)  Wt(kg): --  I&O's Summary      Appearance: Normal	  HEENT:   Normal oral mucosa, PERRL, EOMI	  Lymphatic: No lymphadenopathy  Cardiovascular: Normal S1 S2, No JVD, No murmurs, No edema  Respiratory: Lungs clear to auscultation	  Psychiatry: A & O x 3, Mood & affect appropriate  Gastrointestinal:  Soft, Non-tender, + BS	  Skin: No rashes, No ecchymoses, No cyanosis	  Neurologic: Non-focal  Extremities: Normal range of motion, No clubbing, cyanosis or edema  Vascular: Peripheral pulses palpable 2+ bilaterally        ECG:  	av paced    	  LABS:	 	    CARDIAC MARKERS:  CARDIAC MARKERS ( 29 Nov 2019 00:47 )  0.025 ng/mL / x     / x     / x     / x      CARDIAC MARKERS ( 28 Nov 2019 19:17 )  0.036 ng/mL / x     / x     / x     / x                                  10.8   7.24  )-----------( 243      ( 29 Nov 2019 06:59 )             34.1     11-29    142  |  111<H>  |  43<H>  ----------------------------<  110<H>  4.2   |  26  |  1.79<H>    Ca    8.4      29 Nov 2019 06:59  Phos  3.3     11-29  Mg     2.5     11-29    TPro  6.3  /  Alb  3.0<L>  /  TBili  0.4  /  DBili  x   /  AST  13  /  ALT  16  /  AlkPhos  59  11-29      Lipid Profile: Cholesterol 183  LDL 82  HDL 39        TSH: Thyroid Stimulating Hormone, Serum: 5.20 uU/mL (11-29 @ 06:59)    EXAM:  XR CHEST PORTABLE IMMED 1V                            PROCEDURE DATE:  11/28/2019          INTERPRETATION:  CLINICAL INFORMATION: Right leg fracture, preoperative   planning.    TECHNIQUE: Single view of the chest.    COMPARISON: Chest radiograph 10/10/2018.    FINDINGS:     There is a left-sided remainder with leads overlying the right atrium and   right ventricle. There are low lung volumes.There is no focal   consolidation. There is no pleural effusion or pneumothorax. The cardiac   silhouette is not well evaluated on this projection.    IMPRESSION:     Low lung volumes. No focal consolidation.    EXAM:  ANKLE RIGHT (MINIMUM 3 V)                          EXAM:  LEG AP&LAT - RIGHT                            PROCEDURE DATE:  11/28/2019          INTERPRETATION:  Right tib-fib and right ankle. Patient had a fall with   local trauma.    Right tib-fib. 2 views.    There is a relatively nondisplaced fracture of the upper fibula with   slight comminution.    There is a lower shaft fracture of the tibia with slight displacement.    There are mild degenerative findings.    Right ankle 3 views.    There are small posterior and inferior calcaneal spurs.    There is edema of the lower leg and ankle.    IMPRESSION: Upper shaft fracture of the fibula and lower shaft fracture   of the tibia.        Echocardiogram:    CONCLUSIONS:  1. Mitral annular calcification, and calcified mitral  leaflets with normal diastolic opening. Mild to moderate  mitral regurgitation.  2. Calcified  aortic valve with decreased opening. Peak  transaortic valve gradient equals 42 mm Hg, mean  transaortic valve gradient equals 25 mm Hg, estimated  aortic valve area equals 0.7 sqcm (by continuity equation),  consistent with severe aortic stenosis. Trace aortic  regurgitation.  3. Moderate left ventricular enlargement.  4. Endocardium not well visualized; grossly normal left  ventricular systolic function. Mild diastolic dysfunction  (stage I). Segmental wall motion could not be assessed.  5. Normal right ventricular size and function. A device  lead is visualized in the right heart.    ------------------------------------------------------------------------  Confirmed on  10/12/2018 - 12:46:40 by Jude Rodgers MD  ------------------------------------------------------------------------ none

## 2019-11-29 NOTE — CONSULT NOTE ADULT - ASSESSMENT
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   NEELAM possible diuretic effect.    Follow UA , urine electrolytes and osmolality.  Renal US    AS off diuretics and post IV fluids  Continue to monitor fluid status.    Cardiac monitor -

## 2019-11-29 NOTE — CONSULT NOTE ADULT - SUBJECTIVE AND OBJECTIVE BOX
Pt Name: WASHINGTON DE LA ROSA  MRN: [id number]    ORTHOPEDIC CONSULT:    Orthopedic diagnosis:    HPI: 89 Yo F with PMH of HTn, HLD, Paroxysmal Afib, CHB s/p PPM (2010 [employer] ([employer] PPM), Hip fracture s/p surgical correction, CHF (unknown EF) came with pain in the R leg after she had a fall at home. She lives alone with HHA 24x7 at home and walks with a walker. Aid was helping her with the dress while she suddenly sat down. She denies lightheadedness weakness, dizziness, blurry vision, loss of balance, palpitations before the fall. States that while sitting down her leg tripped and she felt pain. Denies numbness/tingling  .       AMBULATION: Baseline Ambulation [ ] independent [ ] With Cane [x ] With Walker [ ]  Bedbound [ ] Pivot transfers to Wheelchair only    PAST MEDICAL & SURGICAL HISTORY:  Essential hypertension  Hypertension  Endogenous hyperlipemia  PAF (paroxysmal atrial fibrillation): 1/7/2016  Thyroid nodule  Complete heart block: 2010  No significant past surgical history  Status post pericardiocentesis: 5/18/2016  Elective surgery: excision of thyroid nodule  History of cardiac pacemaker: 2010      ALLERGIES: No Known Allergies      MEDICATIONS: acetaminophen   Tablet .. 650 milliGRAM(s) Oral every 6 hours PRN  allopurinol 100 milliGRAM(s) Oral daily  aspirin  chewable 81 milliGRAM(s) Oral daily  atorvastatin 20 milliGRAM(s) Oral daily  carvedilol 3.125 milliGRAM(s) Oral daily  carvedilol 6.25 milliGRAM(s) Oral at bedtime  famotidine    Tablet 20 milliGRAM(s) Oral daily  ferrous    sulfate 325 milliGRAM(s) Oral daily  heparin  Injectable 5000 Unit(s) SubCutaneous every 12 hours  sertraline 25 milliGRAM(s) Oral at bedtime      PHYSICAL EXAM:    Vital Signs Last 24 Hrs  T(C): 36.6 (29 Nov 2019 15:10), Max: 36.9 (29 Nov 2019 04:40)  T(F): 97.9 (29 Nov 2019 15:10), Max: 98.4 (29 Nov 2019 04:40)  HR: 72 (29 Nov 2019 15:10) (67 - 83)  BP: 152/79 (29 Nov 2019 15:10) (120/70 - 169/61)  BP(mean): --  RR: 16 (29 Nov 2019 15:10) (16 - 18)  SpO2: 93% (29 Nov 2019 15:10) (92% - 97%)    Gen: well developed, well nourished, comfortable  Rectal: deferred  Extremities: LLE - no clubbing/cyanosis, no edema, no calf tenderness RLE - Splint in place. + ROM of all digits. Sensation intact. Warm skin  Vascular: LLE - DP/PT 2+ pulses  RLE - unable to access due to splint   Neurological: no focal deficits  Skin: no rash on visible skin        LABS:                        10.8   7.24  )-----------( 243      ( 29 Nov 2019 06:59 )             34.1     11-29    142  |  111<H>  |  43<H>  ----------------------------<  110<H>  4.2   |  26  |  1.79<H>    Ca    8.4      29 Nov 2019 06:59  Phos  3.3     11-29  Mg     2.5     11-29    TPro  6.3  /  Alb  3.0<L>  /  TBili  0.4  /  DBili  x   /  AST  13  /  ALT  16  /  AlkPhos  59  11-29    PT/INR - ( 28 Nov 2019 17:16 )   PT: 11.6 sec;   INR: 1.04 ratio         PTT - ( 28 Nov 2019 17:16 )  PTT:35.4 sec    RADIOLOGY:  R Tib-fib fx    IMPRESSION: Pt is a  89y Female with R tib-fib fx    PLAN:  -  Pain management  -  DVT prophylaxis  -  NWB of RLE  -  F/u post reduction xray of RLE  -  Patient and family deciding on surgical v conservative intervention  -  Case d/w

## 2019-11-29 NOTE — PROGRESS NOTE ADULT - SUBJECTIVE AND OBJECTIVE BOX
PGY 1 Note discussed with supervising resident and primary attending    Patient is a 89y old  Female who presents with a chief complaint of Fall (29 Nov 2019 08:39)      INTERVAL HPI/OVERNIGHT EVENTS: offers no new complaints; current symptoms resolving    MEDICATIONS  (STANDING):  allopurinol 100 milliGRAM(s) Oral daily  aspirin  chewable 81 milliGRAM(s) Oral daily  atorvastatin 20 milliGRAM(s) Oral daily  carvedilol 3.125 milliGRAM(s) Oral daily  carvedilol 6.25 milliGRAM(s) Oral at bedtime  famotidine    Tablet 20 milliGRAM(s) Oral daily  ferrous    sulfate 325 milliGRAM(s) Oral daily  heparin  Injectable 5000 Unit(s) SubCutaneous every 12 hours  sertraline 25 milliGRAM(s) Oral at bedtime    MEDICATIONS  (PRN):  acetaminophen   Tablet .. 650 milliGRAM(s) Oral every 6 hours PRN Mild Pain (1 - 3), Moderate Pain (4 - 6)      __________________________________________________  REVIEW OF SYSTEMS:    CONSTITUTIONAL: No fever,   EYES: no acute visual disturbances  NECK: No pain or stiffness  RESPIRATORY: No cough; No shortness of breath  CARDIOVASCULAR: No chest pain, no palpitations  GASTROINTESTINAL: No pain. No nausea or vomiting; No diarrhea   NEUROLOGICAL: No headache or numbness, no tremors  MUSCULOSKELETAL: mild pain  GENITOURINARY: no dysuria, no frequency, no hesitancy  PSYCHIATRY: no depression , no anxiety  ALL OTHER  ROS negative        Vital Signs Last 24 Hrs  T(C): 36.7 (29 Nov 2019 11:26), Max: 36.9 (29 Nov 2019 04:40)  T(F): 98.1 (29 Nov 2019 11:26), Max: 98.4 (29 Nov 2019 04:40)  HR: 73 (29 Nov 2019 11:26) (67 - 83)  BP: 120/70 (29 Nov 2019 11:26) (120/70 - 169/61)  BP(mean): --  RR: 18 (29 Nov 2019 11:26) (17 - 18)  SpO2: 92% (29 Nov 2019 11:26) (92% - 97%)    ________________________________________________  PHYSICAL EXAM:  GENERAL: NAD  HEENT: Normocephalic;  conjunctivae and sclerae clear; moist mucous membranes;   NECK : supple  CHEST/LUNG: Clear to auscultation bilaterally with good air entry   HEART: S1 S2  regular; no murmurs, gallops or rubs  ABDOMEN: Soft, Nontender, Nondistended; Bowel sounds present  EXTREMITIES:tenderness in the R leg  SKIN: warm and dry; no rash  NERVOUS SYSTEM:  Awake and alert; Oriented  to place, person and time ; no new deficits    _________________________________________________  LABS:                        10.8   7.24  )-----------( 243      ( 29 Nov 2019 06:59 )             34.1     11-29    142  |  111<H>  |  43<H>  ----------------------------<  110<H>  4.2   |  26  |  1.79<H>    Ca    8.4      29 Nov 2019 06:59  Phos  3.3     11-29  Mg     2.5     11-29    TPro  6.3  /  Alb  3.0<L>  /  TBili  0.4  /  DBili  x   /  AST  13  /  ALT  16  /  AlkPhos  59  11-29    PT/INR - ( 28 Nov 2019 17:16 )   PT: 11.6 sec;   INR: 1.04 ratio         PTT - ( 28 Nov 2019 17:16 )  PTT:35.4 sec    CAPILLARY BLOOD GLUCOSE            RADIOLOGY & ADDITIONAL TESTS:    Imaging Personally Reviewed:  YES/NO    Consultant(s) Notes Reviewed:   YES/ No    Care Discussed with Consultants :     Plan of care was discussed with patient and /or primary care giver; all questions and concerns were addressed and care was aligned with patient's wishes.

## 2019-11-29 NOTE — CHART NOTE - NSCHARTNOTEFT_GEN_A_CORE
I was paged by RN to talk to patients's son. He wants her cardiologist to be informed about her admission and treatment plans.  He was concerned that pt is not getting all the required medications especially Tylenol and ezitimibe. I have explained him in detail about the current treatment plan and medications

## 2019-11-30 DIAGNOSIS — N39.0 URINARY TRACT INFECTION, SITE NOT SPECIFIED: ICD-10-CM

## 2019-11-30 LAB
24R-OH-CALCIDIOL SERPL-MCNC: 37.2 NG/ML — SIGNIFICANT CHANGE UP (ref 30–80)
ANION GAP SERPL CALC-SCNC: 5 MMOL/L — SIGNIFICANT CHANGE UP (ref 5–17)
APPEARANCE UR: CLEAR — SIGNIFICANT CHANGE UP
BACTERIA # UR AUTO: ABNORMAL /HPF
BILIRUB UR-MCNC: NEGATIVE — SIGNIFICANT CHANGE UP
BUN SERPL-MCNC: 41 MG/DL — HIGH (ref 7–18)
CALCIUM SERPL-MCNC: 8.3 MG/DL — LOW (ref 8.4–10.5)
CHLORIDE SERPL-SCNC: 114 MMOL/L — HIGH (ref 96–108)
CO2 SERPL-SCNC: 25 MMOL/L — SIGNIFICANT CHANGE UP (ref 22–31)
COLOR SPEC: YELLOW — SIGNIFICANT CHANGE UP
CREAT SERPL-MCNC: 1.66 MG/DL — HIGH (ref 0.5–1.3)
DIFF PNL FLD: ABNORMAL
EPI CELLS # UR: ABNORMAL /HPF
FERRITIN SERPL-MCNC: 240 NG/ML — HIGH (ref 15–150)
GLUCOSE SERPL-MCNC: 113 MG/DL — HIGH (ref 70–99)
GLUCOSE UR QL: NEGATIVE — SIGNIFICANT CHANGE UP
HCT VFR BLD CALC: 31.4 % — LOW (ref 34.5–45)
HGB BLD-MCNC: 9.8 G/DL — LOW (ref 11.5–15.5)
IRON SATN MFR SERPL: 12 % — LOW (ref 15–50)
IRON SATN MFR SERPL: 32 UG/DL — LOW (ref 40–150)
KETONES UR-MCNC: NEGATIVE — SIGNIFICANT CHANGE UP
LEUKOCYTE ESTERASE UR-ACNC: ABNORMAL
MCHC RBC-ENTMCNC: 30.9 PG — SIGNIFICANT CHANGE UP (ref 27–34)
MCHC RBC-ENTMCNC: 31.2 GM/DL — LOW (ref 32–36)
MCV RBC AUTO: 99.1 FL — SIGNIFICANT CHANGE UP (ref 80–100)
NITRITE UR-MCNC: NEGATIVE — SIGNIFICANT CHANGE UP
NRBC # BLD: 0 /100 WBCS — SIGNIFICANT CHANGE UP (ref 0–0)
PH UR: 5 — SIGNIFICANT CHANGE UP (ref 5–8)
PLATELET # BLD AUTO: 218 K/UL — SIGNIFICANT CHANGE UP (ref 150–400)
POTASSIUM SERPL-MCNC: 4.4 MMOL/L — SIGNIFICANT CHANGE UP (ref 3.5–5.3)
POTASSIUM SERPL-SCNC: 4.4 MMOL/L — SIGNIFICANT CHANGE UP (ref 3.5–5.3)
PROT SERPL-MCNC: 5.4 G/DL — LOW (ref 6–8.3)
PROT SERPL-MCNC: 5.4 G/DL — LOW (ref 6–8.3)
PROT UR-MCNC: 15
RBC # BLD: 3.17 M/UL — LOW (ref 3.8–5.2)
RBC # FLD: 13.4 % — SIGNIFICANT CHANGE UP (ref 10.3–14.5)
RBC CASTS # UR COMP ASSIST: ABNORMAL /HPF (ref 0–2)
SODIUM SERPL-SCNC: 144 MMOL/L — SIGNIFICANT CHANGE UP (ref 135–145)
SP GR SPEC: 1.01 — SIGNIFICANT CHANGE UP (ref 1.01–1.02)
T4 FREE SERPL-MCNC: 1 NG/DL — SIGNIFICANT CHANGE UP (ref 0.9–1.8)
TIBC SERPL-MCNC: 265 UG/DL — SIGNIFICANT CHANGE UP (ref 250–450)
UIBC SERPL-MCNC: 233 UG/DL — SIGNIFICANT CHANGE UP (ref 110–370)
UROBILINOGEN FLD QL: NEGATIVE — SIGNIFICANT CHANGE UP
VIT B12 SERPL-MCNC: 434 PG/ML — SIGNIFICANT CHANGE UP (ref 232–1245)
WBC # BLD: 7.07 K/UL — SIGNIFICANT CHANGE UP (ref 3.8–10.5)
WBC # FLD AUTO: 7.07 K/UL — SIGNIFICANT CHANGE UP (ref 3.8–10.5)
WBC UR QL: ABNORMAL /HPF (ref 0–5)

## 2019-11-30 RX ORDER — IRON SUCROSE 20 MG/ML
100 INJECTION, SOLUTION INTRAVENOUS EVERY 24 HOURS
Refills: 0 | Status: DISCONTINUED | OUTPATIENT
Start: 2019-11-30 | End: 2019-12-02

## 2019-11-30 RX ADMIN — Medication 1 DROP(S): at 05:26

## 2019-11-30 RX ADMIN — Medication 81 MILLIGRAM(S): at 11:19

## 2019-11-30 RX ADMIN — Medication 1 DROP(S): at 17:17

## 2019-11-30 RX ADMIN — CARVEDILOL PHOSPHATE 6.25 MILLIGRAM(S): 80 CAPSULE, EXTENDED RELEASE ORAL at 21:29

## 2019-11-30 RX ADMIN — IRON SUCROSE 210 MILLIGRAM(S): 20 INJECTION, SOLUTION INTRAVENOUS at 21:29

## 2019-11-30 RX ADMIN — Medication 100 MILLIGRAM(S): at 11:19

## 2019-11-30 RX ADMIN — HEPARIN SODIUM 5000 UNIT(S): 5000 INJECTION INTRAVENOUS; SUBCUTANEOUS at 05:26

## 2019-11-30 RX ADMIN — HEPARIN SODIUM 5000 UNIT(S): 5000 INJECTION INTRAVENOUS; SUBCUTANEOUS at 17:17

## 2019-11-30 RX ADMIN — FAMOTIDINE 20 MILLIGRAM(S): 10 INJECTION INTRAVENOUS at 11:19

## 2019-11-30 RX ADMIN — SERTRALINE 25 MILLIGRAM(S): 25 TABLET, FILM COATED ORAL at 21:29

## 2019-11-30 RX ADMIN — ATORVASTATIN CALCIUM 20 MILLIGRAM(S): 80 TABLET, FILM COATED ORAL at 11:19

## 2019-11-30 RX ADMIN — Medication 325 MILLIGRAM(S): at 11:19

## 2019-11-30 RX ADMIN — CARVEDILOL PHOSPHATE 3.12 MILLIGRAM(S): 80 CAPSULE, EXTENDED RELEASE ORAL at 05:26

## 2019-11-30 NOTE — CHART NOTE - NSCHARTNOTEFT_GEN_A_CORE
Alerted by patient's brother, that patient and family has come to conclusion regarding operation. Patient and family would like conservative management. Attempted to reach ortho, unsuccessful. Patient would like ortho to speak to patient's decision maker, Vic Banerjee at [phone]

## 2019-11-30 NOTE — PROGRESS NOTE ADULT - SUBJECTIVE AND OBJECTIVE BOX
Ortho Note   89yFemale    Diagnosis:  S/p Right Tib-Fib Fx    Patient is seen and evaluated at bedside; offers no acute complaints. Pain is mild; well controlled.  Pt states her and her family have not come to a decision of surgery v conservative tx.    Vital Signs Last 24 Hrs  T(C): 36.9 (30 Nov 2019 07:20), Max: 36.9 (29 Nov 2019 19:35)  T(F): 98.4 (30 Nov 2019 07:20), Max: 98.5 (29 Nov 2019 19:35)  HR: 68 (30 Nov 2019 07:20) (68 - 96)  BP: 122/74 (30 Nov 2019 07:20) (120/70 - 169/83)  BP(mean): --  RR: 18 (30 Nov 2019 07:20) (16 - 20)  SpO2: 95% (30 Nov 2019 07:20) (92% - 96%)    Physical Exam:    General: AAOx3, in NAD, resting in bed.    RLE:  In nl. splint. + motion at all digits.   LLE: Calves are soft, non-tender. 2+pulses. NVI.                          9.8    7.07  )-----------( 218      ( 30 Nov 2019 07:51 )             31.4     11-30    144  |  114<H>  |  41<H>  ----------------------------<  113<H>  4.4   |  25  |  1.66<H>    Ca    8.3<L>      30 Nov 2019 07:51  Phos  3.3     11-29  Mg     2.5     11-29    TPro  6.3  /  Alb  3.0<L>  /  TBili  0.4  /  DBili  x   /  AST  13  /  ALT  16  /  AlkPhos  59  11-29      Impression:  89yFemale S/p R Tib-Fib fx  Plan:  -  Continue pain management  -  DVT prophylaxis - Hold all anticoag tomorrow  -  NWB of RLE  -  Surgical v conservative intervention discussed again with pt - pending decision  -  F/u post reduction xray  -  Case d/w

## 2019-11-30 NOTE — PROGRESS NOTE ADULT - SUBJECTIVE AND OBJECTIVE BOX
PGY 1 Note discussed with supervising resident and primary attending    Patient is a 89y old  Female who presents with a chief complaint of Fall (2019 09:57)      INTERVAL HPI/OVERNIGHT EVENTS: offers no new complaints; current symptoms resolving    MEDICATIONS  (STANDING):  allopurinol 100 milliGRAM(s) Oral daily  artificial  tears Solution 1 Drop(s) Both EYES two times a day  aspirin  chewable 81 milliGRAM(s) Oral daily  atorvastatin 20 milliGRAM(s) Oral daily  carvedilol 3.125 milliGRAM(s) Oral daily  carvedilol 6.25 milliGRAM(s) Oral at bedtime  famotidine    Tablet 20 milliGRAM(s) Oral daily  ferrous    sulfate 325 milliGRAM(s) Oral daily  heparin  Injectable 5000 Unit(s) SubCutaneous every 12 hours  sertraline 25 milliGRAM(s) Oral at bedtime    MEDICATIONS  (PRN):  acetaminophen   Tablet .. 650 milliGRAM(s) Oral every 6 hours PRN Mild Pain (1 - 3), Moderate Pain (4 - 6)      __________________________________________________  REVIEW OF SYSTEMS:    CONSTITUTIONAL: No fever,   EYES: no acute visual disturbances  NECK: No pain or stiffness  RESPIRATORY: No cough; No shortness of breath  CARDIOVASCULAR: No chest pain, no palpitations  GASTROINTESTINAL: No pain. No nausea or vomiting; No diarrhea   NEUROLOGICAL: No headache or numbness, no tremors  MUSCULOSKELETAL: No joint pain, no muscle pain  GENITOURINARY: no dysuria, no frequency, no hesitancy  PSYCHIATRY: no depression , no anxiety  ALL OTHER  ROS negative        Vital Signs Last 24 Hrs  T(C): 36.9 (2019 07:20), Max: 36.9 (2019 19:35)  T(F): 98.4 (2019 07:20), Max: 98.5 (2019 19:35)  HR: 68 (2019 07:20) (68 - 96)  BP: 122/74 (2019 07:20) (120/70 - 169/83)  BP(mean): --  RR: 18 (2019 07:20) (16 - 20)  SpO2: 95% (2019 07:20) (92% - 96%)    ________________________________________________  PHYSICAL EXAM:  GENERAL: NAD  HEENT: Normocephalic;  conjunctivae and sclerae clear; moist mucous membranes;   NECK : supple  CHEST/LUNG: Clear to auscultation bilaterally with good air entry   HEART: S1 S2  regular; no murmurs, gallops or rubs  ABDOMEN: Soft, Nontender, Nondistended; Bowel sounds present  EXTREMITIES: ortho placed a splint, closed reduction  SKIN: warm and dry; no rash  NERVOUS SYSTEM:  Awake and alert; Oriented  to place, person and time ; no new deficits    _________________________________________________  LABS:                        9.8    7.07  )-----------( 218      ( 2019 07:51 )             31.4     11    144  |  114<H>  |  41<H>  ----------------------------<  113<H>  4.4   |  25  |  1.66<H>    Ca    8.3<L>      2019 07:51  Phos  3.3       Mg     2.5         TPro  6.3  /  Alb  3.0<L>  /  TBili  0.4  /  DBili  x   /  AST  13  /  ALT  16  /  AlkPhos  59      PT/INR - ( 2019 17:16 )   PT: 11.6 sec;   INR: 1.04 ratio         PTT - ( 2019 17:16 )  PTT:35.4 sec  Urinalysis Basic - ( 2019 09:01 )    Color: Yellow / Appearance: Clear / S.015 / pH: x  Gluc: x / Ketone: Negative  / Bili: Negative / Urobili: Negative   Blood: x / Protein: 15 / Nitrite: Negative   Leuk Esterase: Moderate / RBC: 2-5 /HPF / WBC 6-10 /HPF   Sq Epi: x / Non Sq Epi: Occasional /HPF / Bacteria: Trace /HPF      CAPILLARY BLOOD GLUCOSE            RADIOLOGY & ADDITIONAL TESTS:    Imaging Personally Reviewed:  YES/NO    Consultant(s) Notes Reviewed:   YES/ No    Care Discussed with Consultants :     Plan of care was discussed with patient and /or primary care giver; all questions and concerns were addressed and care was aligned with patient's wishes.

## 2019-11-30 NOTE — PROGRESS NOTE ADULT - SUBJECTIVE AND OBJECTIVE BOX
CHIEF COMPLAINT:Patient is a 89y old  Female who presents with a chief complaint of Fall.Pt appears comfortable.    	  REVIEW OF SYSTEMS:  CONSTITUTIONAL: No fever, weight loss, or fatigue  EYES: No eye pain, visual disturbances, or discharge  ENT:  No difficulty hearing, tinnitus, vertigo; No sinus or throat pain  NECK: No pain or stiffness  RESPIRATORY: No cough, wheezing, chills or hemoptysis; No Shortness of Breath  CARDIOVASCULAR: No chest pain, palpitations, passing out, dizziness, or leg swelling  GASTROINTESTINAL: No abdominal or epigastric pain. No nausea, vomiting, or hematemesis; No diarrhea or constipation. No melena or hematochezia.  GENITOURINARY: No dysuria, frequency, hematuria, or incontinence  NEUROLOGICAL: No headaches, memory loss, loss of strength, numbness, or tremors  SKIN: No itching, burning, rashes, or lesions   LYMPH Nodes: No enlarged glands  ENDOCRINE: No heat or cold intolerance; No hair loss  MUSCULOSKELETAL: No joint pain or swelling; No muscle, back, or extremity pain  PSYCHIATRIC: No depression, anxiety, mood swings, or difficulty sleeping  HEME/LYMPH: No easy bruising, or bleeding gums  ALLERGY AND IMMUNOLOGIC: No hives or eczema	      PHYSICAL EXAM:  T(C): 36.9 (11-30-19 @ 07:20), Max: 36.9 (11-29-19 @ 19:35)  HR: 68 (11-30-19 @ 07:20) (68 - 96)  BP: 122/74 (11-30-19 @ 07:20) (120/70 - 169/83)  RR: 18 (11-30-19 @ 07:20) (16 - 20)  SpO2: 95% (11-30-19 @ 07:20) (92% - 96%)  Wt(kg): --  I&O's Summary      Appearance: Normal	  HEENT:   Normal oral mucosa, PERRL, EOMI	  Lymphatic: No lymphadenopathy  Cardiovascular: Normal S1 S2, 2/6sm  Respiratory: Lungs clear to auscultation	  Psychiatry: A & O x 3, Mood & affect appropriate  Gastrointestinal:  Soft, Non-tender, + BS	  Skin: No rashes, No ecchymoses, No cyanosis	  Neurologic: Non-focal  Extremities: Normal range of motion, No clubbing, cyanosis or edema      MEDICATIONS  (STANDING):  allopurinol 100 milliGRAM(s) Oral daily  artificial  tears Solution 1 Drop(s) Both EYES two times a day  aspirin  chewable 81 milliGRAM(s) Oral daily  atorvastatin 20 milliGRAM(s) Oral daily  carvedilol 3.125 milliGRAM(s) Oral daily  carvedilol 6.25 milliGRAM(s) Oral at bedtime  famotidine    Tablet 20 milliGRAM(s) Oral daily  ferrous    sulfate 325 milliGRAM(s) Oral daily  heparin  Injectable 5000 Unit(s) SubCutaneous every 12 hours  sertraline 25 milliGRAM(s) Oral at bedtime      	  LABS:	 	      CARDIAC MARKERS ( 29 Nov 2019 00:47 )  0.025 ng/mL / x     / x     / x     / x      CARDIAC MARKERS ( 28 Nov 2019 19:17 )  0.036 ng/mL / x     / x     / x     / x                                    9.8    7.07  )-----------( 218      ( 30 Nov 2019 07:51 )             31.4     11-30    144  |  114<H>  |  41<H>  ----------------------------<  113<H>  4.4   |  25  |  1.66<H>    Ca    8.3<L>      30 Nov 2019 07:51  Phos  3.3     11-29  Mg     2.5     11-29    TPro  6.3  /  Alb  3.0<L>  /  TBili  0.4  /  DBili  x   /  AST  13  /  ALT  16  /  AlkPhos  59  11-29      Lipid Profile: Cholesterol 183  LDL 82  HDL 39      HgA1c: Hemoglobin A1C, Whole Blood: 5.4 % (11-29 @ 09:06)    TSH: Thyroid Stimulating Hormone, Serum: 5.20 uU/mL (11-29 @ 06:59)      	  Iron with Total Binding Capacity in AM (11.30.19 @ 07:51)    Iron - Total Binding Capacity.: 265 ug/dL    % Saturation, Iron: 12 %    Iron Total, Serum: 32 ug/dL    Unsaturated Iron Binding Capacity: 233 ug/dL    Iron with Total Binding Capacity (11.29.19 @ 12:49)    Iron - Total Binding Capacity.: 297 ug/dL    % Saturation, Iron: 9 %    Iron Total, Serum: 27 ug/dL    Unsaturated Iron Binding Capacity: 270 ug/dL          OBSERVATIONS:  Mitral Valve: Mitral annular calcification, and calcified  mitral leaflets with normal diastolic opening. Mild mitral  regurgitation.  Aortic Root: Aortic Root: 3.1 cm.    Aortic Valve: Calcified trileaflet aortic valve with  decreased opening. Peak transaortic valve gradient equals  51.8 mm Hg, mean transaortic valve gradient equals 30 mm  Hg, estimated aortic valve area equals 0.9 sqcm (by  continuity equation), consistent with severe aortic  stenosis.The dimensionless index is .30. Peak left  ventricular outflow tract gradient equals 4.8 mm Hg.  Left Atrium: Normal left atrium.  LA volume index = 19  cc/m2.  Left Ventricle: Normal Left Ventricular Systolic Function,  (EF = 55%) Normal left ventricular internal dimensions and  wall thicknesses. Grade I diastolic dysfunction (Impaired  relaxation).  Right Heart: Normal right atrium. Normal right ventricular  size and systolic function (TAPSE 1.7 cm). A device lead is  visualized in the right heart. There is mild tricuspid  regurgitation. There is mild pulmonic regurgitation.  Pericardium/PleuraNormal pericardium with no pericardial  effusion.  Hemodynamic: RA Pressure is 8 mm Hg. RV systolic pressure  is moderately increased at  49 mm Hg.    PPM-NL fx, 3.5 yrs battery.

## 2019-11-30 NOTE — PROGRESS NOTE ADULT - ATTENDING COMMENTS
Agreed and concurred on the above.    Contacted ortho service, pt to be schedule for OR possibly over weekend. In consult with cardio considering her moderate AS  and h/o longstanding Afib will clear pt at moderate to high risk for an emergently needed procedure. Pt has been medically optimized.
Agreed and concurred on the above.    Pt's perioperative risk set at high given her severe AS has been expressed to her HCP. Family is to decide in conjunction with ortho as to weather proceed with conservative therapy vs open reduction.

## 2019-12-01 DIAGNOSIS — S82.831A OTHER FRACTURE OF UPPER AND LOWER END OF RIGHT FIBULA, INITIAL ENCOUNTER FOR CLOSED FRACTURE: ICD-10-CM

## 2019-12-01 DIAGNOSIS — S82.241A DISPLACED SPIRAL FRACTURE OF SHAFT OF RIGHT TIBIA, INITIAL ENCOUNTER FOR CLOSED FRACTURE: ICD-10-CM

## 2019-12-01 LAB
CULTURE RESULTS: SIGNIFICANT CHANGE UP
CULTURE RESULTS: SIGNIFICANT CHANGE UP
SPECIMEN SOURCE: SIGNIFICANT CHANGE UP

## 2019-12-01 PROCEDURE — 73590 X-RAY EXAM OF LOWER LEG: CPT | Mod: 26,RT

## 2019-12-01 RX ORDER — CARVEDILOL PHOSPHATE 80 MG/1
12.5 CAPSULE, EXTENDED RELEASE ORAL EVERY 12 HOURS
Refills: 0 | Status: DISCONTINUED | OUTPATIENT
Start: 2019-12-01 | End: 2019-12-02

## 2019-12-01 RX ADMIN — CARVEDILOL PHOSPHATE 12.5 MILLIGRAM(S): 80 CAPSULE, EXTENDED RELEASE ORAL at 17:29

## 2019-12-01 RX ADMIN — Medication 81 MILLIGRAM(S): at 11:43

## 2019-12-01 RX ADMIN — Medication 1 DROP(S): at 17:29

## 2019-12-01 RX ADMIN — FAMOTIDINE 20 MILLIGRAM(S): 10 INJECTION INTRAVENOUS at 11:42

## 2019-12-01 RX ADMIN — HEPARIN SODIUM 5000 UNIT(S): 5000 INJECTION INTRAVENOUS; SUBCUTANEOUS at 06:06

## 2019-12-01 RX ADMIN — IRON SUCROSE 210 MILLIGRAM(S): 20 INJECTION, SOLUTION INTRAVENOUS at 22:35

## 2019-12-01 RX ADMIN — Medication 100 MILLIGRAM(S): at 11:42

## 2019-12-01 RX ADMIN — ATORVASTATIN CALCIUM 20 MILLIGRAM(S): 80 TABLET, FILM COATED ORAL at 11:43

## 2019-12-01 RX ADMIN — CARVEDILOL PHOSPHATE 3.12 MILLIGRAM(S): 80 CAPSULE, EXTENDED RELEASE ORAL at 06:06

## 2019-12-01 RX ADMIN — HEPARIN SODIUM 5000 UNIT(S): 5000 INJECTION INTRAVENOUS; SUBCUTANEOUS at 17:29

## 2019-12-01 RX ADMIN — SERTRALINE 25 MILLIGRAM(S): 25 TABLET, FILM COATED ORAL at 22:35

## 2019-12-01 NOTE — PROGRESS NOTE ADULT - SUBJECTIVE AND OBJECTIVE BOX
CHIEF COMPLAINT:Patient is a 89y old  Female who presents with a chief complaint of Fall.Pt appears comfortable.    	  REVIEW OF SYSTEMS:  CONSTITUTIONAL: No fever, weight loss, or fatigue  EYES: No eye pain, visual disturbances, or discharge  ENT:  No difficulty hearing, tinnitus, vertigo; No sinus or throat pain  NECK: No pain or stiffness  RESPIRATORY: No cough, wheezing, chills or hemoptysis; No Shortness of Breath  CARDIOVASCULAR: No chest pain, palpitations, passing out, dizziness, or leg swelling  GASTROINTESTINAL: No abdominal or epigastric pain. No nausea, vomiting, or hematemesis; No diarrhea or constipation. No melena or hematochezia.  GENITOURINARY: No dysuria, frequency, hematuria, or incontinence  NEUROLOGICAL: No headaches, memory loss, loss of strength, numbness, or tremors  SKIN: No itching, burning, rashes, or lesions   LYMPH Nodes: No enlarged glands  ENDOCRINE: No heat or cold intolerance; No hair loss  MUSCULOSKELETAL: No joint pain or swelling; No muscle, back, or extremity pain  PSYCHIATRIC: No depression, anxiety, mood swings, or difficulty sleeping  HEME/LYMPH: No easy bruising, or bleeding gums  ALLERGY AND IMMUNOLOGIC: No hives or eczema	      PHYSICAL EXAM:  T(C): 36.2 (12-01-19 @ 08:09), Max: 37 (11-30-19 @ 23:42)  HR: 69 (12-01-19 @ 08:09) (66 - 84)  BP: 149/73 (12-01-19 @ 08:09) (121/74 - 186/83)  RR: 18 (12-01-19 @ 08:09) (18 - 18)  SpO2: 95% (12-01-19 @ 08:09) (93% - 95%)  Wt(kg): --  I&O's Summary    30 Nov 2019 07:01  -  01 Dec 2019 07:00  --------------------------------------------------------  IN: 325 mL / OUT: 375 mL / NET: -50 mL        Appearance: Normal	  HEENT:   Normal oral mucosa, PERRL, EOMI	  Lymphatic: No lymphadenopathy  Cardiovascular: Normal S1 S2, 2/6sm  Respiratory: Lungs clear to auscultation	  Psychiatry: A & O x 3, Mood & affect appropriate  Gastrointestinal:  Soft, Non-tender, + BS	  Skin: No rashes, No ecchymoses, No cyanosis	  Neurologic: Non-focal  Extremities: Normal range of motion, No clubbing, cyanosis or edema  Vascular: Peripheral pulses palpable 2+ bilaterally    MEDICATIONS  (STANDING):  allopurinol 100 milliGRAM(s) Oral daily  artificial  tears Solution 1 Drop(s) Both EYES two times a day  aspirin  chewable 81 milliGRAM(s) Oral daily  atorvastatin 20 milliGRAM(s) Oral daily  carvedilol 12.5 milliGRAM(s) Oral every 12 hours  famotidine    Tablet 20 milliGRAM(s) Oral daily  heparin  Injectable 5000 Unit(s) SubCutaneous every 12 hours  iron sucrose IVPB 100 milliGRAM(s) IV Intermittent every 24 hours  sertraline 25 milliGRAM(s) Oral at bedtime      TELEMETRY: paced	      LABS:	 	                       9.8    7.07  )-----------( 218      ( 30 Nov 2019 07:51 )             31.4     11-30    144  |  114<H>  |  41<H>  ----------------------------<  113<H>  4.4   |  25  |  1.66<H>    Ca    8.3<L>      30 Nov 2019 07:51    TPro  5.4<L>  /  Alb  x   /  TBili  x   /  DBili  x   /  AST  x   /  ALT  x   /  AlkPhos  x   11-30      Lipid Profile: Cholesterol 183  LDL 82  HDL 39      HgA1c: Hemoglobin A1C, Whole Blood: 5.4 % (11-29 @ 09:06)    TSH: Thyroid Stimulating Hormone, Serum: 5.20 uU/mL (11-29 @ 06:59)

## 2019-12-01 NOTE — PHYSICAL THERAPY INITIAL EVALUATION ADULT - PERTINENT HX OF CURRENT PROBLEM, REHAB EVAL
Pt admitted s/p fall, tib/fib fracture, RLE in splint, NWB RLE per ortho. Pt reports her "legs gave out" and she "fell to the side" which resulted in the R tib/fib fracture.

## 2019-12-01 NOTE — PHYSICAL THERAPY INITIAL EVALUATION ADULT - ACTIVE RANGE OF MOTION EXAMINATION, REHAB EVAL
Ventricular Rate : 96  Atrial Rate : 96  P-R Interval : 132  QRS Duration : 98  Q-T Interval : 340  QTC Calculation(Bezet) : 429  P Axis : 38  R Axis : -31  T Axis : 38  Diagnosis : Normal sinus rhythm  Possible Left atrial enlargement  Left axis deviation  Nonspecific T wave abnormality  Abnormal ECG  When compared with ECG of 16-DEC-2017 20:23,  Criteria for Inferior infarct are no longer present  Nonspecific T wave abnormality no longer evident in Lateral leads  Confirmed by Keaton Russo ([id number]) on 12/9/2018 4:02:06 PM   except R ankle unable to be assessed d/t splint, limited R knee flexion d/t splint/no Active ROM deficits were identified

## 2019-12-01 NOTE — PHYSICAL THERAPY INITIAL EVALUATION ADULT - DIAGNOSIS, PT EVAL
At this time pt presents with impairments in strength, balance, and functional mobility s/p R tib/fib fx.

## 2019-12-01 NOTE — PHYSICAL THERAPY INITIAL EVALUATION ADULT - CRITERIA FOR SKILLED THERAPEUTIC INTERVENTIONS
functional limitations in following categories/therapy frequency/anticipated discharge recommendation/risk reduction/prevention/rehab potential/predicted duration of therapy intervention/impairments found

## 2019-12-01 NOTE — PHYSICAL THERAPY INITIAL EVALUATION ADULT - MODALITIES TREATMENT COMMENTS
I was present during the key portion of the procedure. Pt c/o pain in L heel, tenderness to soft tissue of L heel present, no tenderness along calcaneus. Pt reports she has had this pain before last time she was in the hospital and reports "i think its from the pressure of my heel in the bed".

## 2019-12-01 NOTE — PROGRESS NOTE ADULT - SUBJECTIVE AND OBJECTIVE BOX
[place]  MR# [id number]  89yFemale        Patient is a 89y old  Female who presents with a chief complaint of Fall, right tibia-fibula fracture (01 Dec 2019 12:33)      INTERVAL HPI/OVERNIGHT EVENTS:  Patient seen and examined at bedside. No notations of chest pain, palpitation, SOB, orthopnea, nausea, vomiting or abdominal pain.    ALLERGIES  No Known Allergies      MEDICATIONS  acetaminophen   Tablet .. 650 milliGRAM(s) Oral every 6 hours PRN Mild Pain (1 - 3), Moderate Pain (4 - 6)  allopurinol 100 milliGRAM(s) Oral daily  artificial  tears Solution 1 Drop(s) Both EYES two times a day  aspirin  chewable 81 milliGRAM(s) Oral daily  atorvastatin 20 milliGRAM(s) Oral daily  carvedilol 12.5 milliGRAM(s) Oral every 12 hours  famotidine    Tablet 20 milliGRAM(s) Oral daily  heparin  Injectable 5000 Unit(s) SubCutaneous every 12 hours  iron sucrose IVPB 100 milliGRAM(s) IV Intermittent every 24 hours  sertraline 25 milliGRAM(s) Oral at bedtime              REVIEW OF SYSTEMS:  CONSTITUTIONAL: No fever, weight loss, or fatigue  EYES: No eye pain, visual disturbances, or discharge  ENT:  No difficulty hearing, tinnitus, vertigo; No sinus or throat pain  NECK: No pain or stiffness  RESPIRATORY: No cough, wheezing, chills or hemoptysis; No Shortness of Breath  CARDIOVASCULAR: No chest pain, palpitations, passing out, dizziness, or leg swelling  GASTROINTESTINAL: No abdominal or epigastric pain. No nausea, vomiting, or hematemesis; No diarrhea or constipation. No melena or hematochezia.  GENITOURINARY: No dysuria, frequency, hematuria, or incontinence  NEUROLOGICAL: No headaches, memory loss, loss of strength, numbness, or tremors  SKIN: No itching, burning, rashes, or lesions   LYMPH Nodes: No enlarged glands  ENDOCRINE: No heat or cold intolerance; No hair loss  MUSCULOSKELETAL: No joint pain or swelling; No muscle, back, or extremity pain  PSYCHIATRIC: No depression, anxiety, mood swings, or difficulty sleeping  HEME/LYMPH: No easy bruising, or bleeding gums  ALLERGY AND IMMUNOLOGIC: No hives or eczema	    [ ] All others negative	  [ ] Unable to obtain      T(C): 36.4 (19 @ 15:27), Max: 37 (19 @ 23:42)  T(F): 97.6 (19 @ 15:27), Max: 98.6 (19 @ 23:42)  HR: 88 (19 @ 15:27) (69 - 88)  BP: 118/79 (19 @ 15:27) (118/79 - 186/83)  RR: 18 (19 @ 15:27) (18 - 18)  SpO2: 94% (19 @ 15:27) (93% - 98%)  Wt(kg): --    I&O's Summary    2019 07:01  -  01 Dec 2019 07:00  --------------------------------------------------------  IN: 325 mL / OUT: 375 mL / NET: -50 mL          PHYSICAL EXAM:  A X O x  HEAD:  Atraumatic, Normocephalic  EYES: EOMI, PERRLA, conjunctiva and sclera clear  NECK: Supple, No JVD, Normal thyroid  Resp: CTAB, No crackles, wheezing,   CVS: Regular rate and rhythm; No discernable murmurs, rubs, or gallops  ABD: Soft, Nontender, Nondistended; Bowel sounds present  EXTREMITIES:  2+ Peripheral Pulses, No edema  LYMPH: No dicernable lymphadenopathy noted  GENERAL: NAD, well-groomed, well-developed      LABS:                        9.8    7.07  )-----------( 218      ( 2019 07:51 )             31.4         144  |  114<H>  |  41<H>  ----------------------------<  113<H>  4.4   |  25  |  1.66<H>    Ca    8.3<L>      2019 07:51    TPro  5.4<L>  /  Alb  x   /  TBili  x   /  DBili  x   /  AST  x   /  ALT  x   /  AlkPhos  x         Urinalysis Basic - ( 2019 09:01 )    Color: Yellow / Appearance: Clear / S.015 / pH: x  Gluc: x / Ketone: Negative  / Bili: Negative / Urobili: Negative   Blood: x / Protein: 15 / Nitrite: Negative   Leuk Esterase: Moderate / RBC: 2-5 /HPF / WBC 6-10 /HPF   Sq Epi: x / Non Sq Epi: Occasional /HPF / Bacteria: Trace /HPF      CAPILLARY BLOOD GLUCOSE          Troponins:  ProBNP:  Lipid Profile:   HgA1c:  TSH:           RADIOLOGY & ADDITIONAL TESTS:    Imaging Personally Reviewed:  [ ] YES  [ ] NO      Consultant(s) Notes Reviewed:  [x ] YES  [ ] NO    Care Discussed with Consultants/Other Providers [ x] YES  [ ] NO          PAST MEDICAL & SURGICAL HISTORY:  Essential hypertension  Hypertension  Endogenous hyperlipemia  PAF (paroxysmal atrial fibrillation): 2016  Thyroid nodule  Complete heart block:   No significant past surgical history  Status post pericardiocentesis: 2016  Elective surgery: excision of thyroid nodule  History of cardiac pacemaker:         Unspecified fracture of shaft of unspecified tibia, initial encounter for closed fracture  H/o or current diagnosis of HF- no contraindication to ACEI/ARBs  No pertinent family history in first degree relatives  Handoff  MEWS Score  Essential hypertension  Hypertension  Endogenous hyperlipemia  PAF (paroxysmal atrial fibrillation)  Thyroid nodule  Complete heart block  Tibia fracture  Other closed fracture of proximal end of right fibula, initial encounter  Closed displaced spiral fracture of shaft of right tibia, initial encounter  UTI (urinary tract infection)  Prophylactic measure  Hypertension  CHF (congestive heart failure)  Acute kidney injury superimposed on CKD  PAF (paroxysmal atrial fibrillation)  Fall  Tibia fracture  No significant past surgical history  Status post pericardiocentesis  Elective surgery  History of cardiac pacemaker  A S/P FALL  90+  Fall

## 2019-12-01 NOTE — PHYSICAL THERAPY INITIAL EVALUATION ADULT - MANUAL MUSCLE TESTING RESULTS, REHAB EVAL
grossly assessed due to/BUE grossly 4+/5, LLE grossly 4-/5, RLE grossly 3/5 (R ankle not assessed, in splint)

## 2019-12-01 NOTE — PHYSICAL THERAPY INITIAL EVALUATION ADULT - ADDITIONAL COMMENTS
Pt reports she was ambulatory with RW in the house and able to ambulate without assist. Pt has HHA 24/7 who assists with some ADLs, such as showering, and all community activities.    Attempted to call pts son Vic multiple times ([phone]), busy signal reached

## 2019-12-02 ENCOUNTER — TRANSCRIPTION ENCOUNTER (OUTPATIENT)
Age: 84
End: 2019-12-02

## 2019-12-02 VITALS
RESPIRATION RATE: 18 BRPM | SYSTOLIC BLOOD PRESSURE: 152 MMHG | OXYGEN SATURATION: 96 % | DIASTOLIC BLOOD PRESSURE: 56 MMHG | HEART RATE: 71 BPM | TEMPERATURE: 98 F

## 2019-12-02 LAB
% ALBUMIN: 55.9 % — SIGNIFICANT CHANGE UP
% ALPHA 1: 6.3 % — SIGNIFICANT CHANGE UP
% ALPHA 2: 15.1 % — SIGNIFICANT CHANGE UP
% BETA: 13.3 % — SIGNIFICANT CHANGE UP
% GAMMA: 9.4 % — SIGNIFICANT CHANGE UP
ALBUMIN SERPL ELPH-MCNC: 3 G/DL — LOW (ref 3.6–5.5)
ALBUMIN/GLOB SERPL ELPH: 1.2 RATIO — SIGNIFICANT CHANGE UP
ALPHA1 GLOB SERPL ELPH-MCNC: 0.3 G/DL — SIGNIFICANT CHANGE UP (ref 0.1–0.4)
ALPHA2 GLOB SERPL ELPH-MCNC: 0.8 G/DL — SIGNIFICANT CHANGE UP (ref 0.5–1)
B-GLOBULIN SERPL ELPH-MCNC: 0.7 G/DL — SIGNIFICANT CHANGE UP (ref 0.5–1)
GAMMA GLOBULIN: 0.5 G/DL — LOW (ref 0.6–1.6)
KAPPA LC SER QL IFE: 2.38 MG/DL — HIGH (ref 0.33–1.94)
KAPPA/LAMBDA FREE LIGHT CHAIN RATIO, SERUM: 0.76 RATIO — SIGNIFICANT CHANGE UP (ref 0.26–1.65)
LAMBDA LC SER QL IFE: 3.12 MG/DL — HIGH (ref 0.57–2.63)
PROT PATTERN SERPL ELPH-IMP: SIGNIFICANT CHANGE UP

## 2019-12-02 PROCEDURE — 86901 BLOOD TYPING SEROLOGIC RH(D): CPT

## 2019-12-02 PROCEDURE — 86850 RBC ANTIBODY SCREEN: CPT

## 2019-12-02 PROCEDURE — 84484 ASSAY OF TROPONIN QUANT: CPT

## 2019-12-02 PROCEDURE — 84165 PROTEIN E-PHORESIS SERUM: CPT

## 2019-12-02 PROCEDURE — 73590 X-RAY EXAM OF LOWER LEG: CPT

## 2019-12-02 PROCEDURE — 85610 PROTHROMBIN TIME: CPT

## 2019-12-02 PROCEDURE — 82728 ASSAY OF FERRITIN: CPT

## 2019-12-02 PROCEDURE — 82306 VITAMIN D 25 HYDROXY: CPT

## 2019-12-02 PROCEDURE — 83521 IG LIGHT CHAINS FREE EACH: CPT

## 2019-12-02 PROCEDURE — 93306 TTE W/DOPPLER COMPLETE: CPT

## 2019-12-02 PROCEDURE — 84550 ASSAY OF BLOOD/URIC ACID: CPT

## 2019-12-02 PROCEDURE — 80053 COMPREHEN METABOLIC PANEL: CPT

## 2019-12-02 PROCEDURE — 73552 X-RAY EXAM OF FEMUR 2/>: CPT

## 2019-12-02 PROCEDURE — 82746 ASSAY OF FOLIC ACID SERUM: CPT

## 2019-12-02 PROCEDURE — 80048 BASIC METABOLIC PNL TOTAL CA: CPT

## 2019-12-02 PROCEDURE — 80061 LIPID PANEL: CPT

## 2019-12-02 PROCEDURE — 84100 ASSAY OF PHOSPHORUS: CPT

## 2019-12-02 PROCEDURE — 83036 HEMOGLOBIN GLYCOSYLATED A1C: CPT

## 2019-12-02 PROCEDURE — 72190 X-RAY EXAM OF PELVIS: CPT | Mod: 26

## 2019-12-02 PROCEDURE — 93005 ELECTROCARDIOGRAM TRACING: CPT

## 2019-12-02 PROCEDURE — 71045 X-RAY EXAM CHEST 1 VIEW: CPT

## 2019-12-02 PROCEDURE — 84155 ASSAY OF PROTEIN SERUM: CPT

## 2019-12-02 PROCEDURE — 73610 X-RAY EXAM OF ANKLE: CPT

## 2019-12-02 PROCEDURE — 72190 X-RAY EXAM OF PELVIS: CPT

## 2019-12-02 PROCEDURE — 83735 ASSAY OF MAGNESIUM: CPT

## 2019-12-02 PROCEDURE — 84466 ASSAY OF TRANSFERRIN: CPT

## 2019-12-02 PROCEDURE — 36415 COLL VENOUS BLD VENIPUNCTURE: CPT

## 2019-12-02 PROCEDURE — 84439 ASSAY OF FREE THYROXINE: CPT

## 2019-12-02 PROCEDURE — 87086 URINE CULTURE/COLONY COUNT: CPT

## 2019-12-02 PROCEDURE — 81001 URINALYSIS AUTO W/SCOPE: CPT

## 2019-12-02 PROCEDURE — 85027 COMPLETE CBC AUTOMATED: CPT

## 2019-12-02 PROCEDURE — 83550 IRON BINDING TEST: CPT

## 2019-12-02 PROCEDURE — 84443 ASSAY THYROID STIM HORMONE: CPT

## 2019-12-02 PROCEDURE — 99285 EMERGENCY DEPT VISIT HI MDM: CPT | Mod: 25

## 2019-12-02 PROCEDURE — 86900 BLOOD TYPING SEROLOGIC ABO: CPT

## 2019-12-02 PROCEDURE — 83540 ASSAY OF IRON: CPT

## 2019-12-02 PROCEDURE — 73552 X-RAY EXAM OF FEMUR 2/>: CPT | Mod: 26,RT

## 2019-12-02 PROCEDURE — 97162 PT EVAL MOD COMPLEX 30 MIN: CPT

## 2019-12-02 PROCEDURE — 73590 X-RAY EXAM OF LOWER LEG: CPT | Mod: 26,RT

## 2019-12-02 PROCEDURE — 85730 THROMBOPLASTIN TIME PARTIAL: CPT

## 2019-12-02 PROCEDURE — 82607 VITAMIN B-12: CPT

## 2019-12-02 RX ORDER — CARVEDILOL PHOSPHATE 80 MG/1
1 CAPSULE, EXTENDED RELEASE ORAL
Qty: 0 | Refills: 0 | DISCHARGE

## 2019-12-02 RX ORDER — FUROSEMIDE 40 MG
1.5 TABLET ORAL
Qty: 0 | Refills: 0 | DISCHARGE

## 2019-12-02 RX ORDER — CARVEDILOL PHOSPHATE 80 MG/1
1 CAPSULE, EXTENDED RELEASE ORAL
Qty: 0 | Refills: 0 | DISCHARGE
Start: 2019-12-02

## 2019-12-02 RX ADMIN — FAMOTIDINE 20 MILLIGRAM(S): 10 INJECTION INTRAVENOUS at 12:39

## 2019-12-02 RX ADMIN — HEPARIN SODIUM 5000 UNIT(S): 5000 INJECTION INTRAVENOUS; SUBCUTANEOUS at 17:20

## 2019-12-02 RX ADMIN — CARVEDILOL PHOSPHATE 12.5 MILLIGRAM(S): 80 CAPSULE, EXTENDED RELEASE ORAL at 05:49

## 2019-12-02 RX ADMIN — HEPARIN SODIUM 5000 UNIT(S): 5000 INJECTION INTRAVENOUS; SUBCUTANEOUS at 05:49

## 2019-12-02 RX ADMIN — Medication 81 MILLIGRAM(S): at 12:39

## 2019-12-02 RX ADMIN — ATORVASTATIN CALCIUM 20 MILLIGRAM(S): 80 TABLET, FILM COATED ORAL at 12:39

## 2019-12-02 RX ADMIN — Medication 100 MILLIGRAM(S): at 12:39

## 2019-12-02 RX ADMIN — Medication 1 DROP(S): at 17:20

## 2019-12-02 RX ADMIN — CARVEDILOL PHOSPHATE 12.5 MILLIGRAM(S): 80 CAPSULE, EXTENDED RELEASE ORAL at 17:20

## 2019-12-02 RX ADMIN — Medication 1 DROP(S): at 05:50

## 2019-12-02 NOTE — PROGRESS NOTE ADULT - PROBLEM SELECTOR PROBLEM 1
Closed displaced spiral fracture of shaft of right tibia, initial encounter
Tibia fracture

## 2019-12-02 NOTE — DISCHARGE NOTE PROVIDER - NSDCCPCAREPLAN_GEN_ALL_CORE_FT
PRINCIPAL DISCHARGE DIAGNOSIS  Diagnosis: Tibia fracture  Assessment and Plan of Treatment:       SECONDARY DISCHARGE DIAGNOSES  Diagnosis: PAF (paroxysmal atrial fibrillation)  Assessment and Plan of Treatment: PAF (paroxysmal atrial fibrillation)    Diagnosis: CHF (congestive heart failure)  Assessment and Plan of Treatment: During your hospitalization, we discontinued your furosemide due to findings of acute kidney injury. Please follow up with primary care doctor in 1 week to repeat BMP to determine creatinine levels and whether or not to continue with furosemide. Continue with carvedilol as indicated.    Maintain healthy diet and excersise and lose weight.  If you eat too much salt or drink too much fluid, your body's water content may increase and make your heart work harder. This can worsen your CHF.  Follow up with your primary care physician in one week after discharge to inform of your hospitalization.    Diagnosis: Fall  Assessment and Plan of Treatment: PRINCIPAL DISCHARGE DIAGNOSIS  Diagnosis: Tibia fracture  Assessment and Plan of Treatment: You presented after a fall. X-rays of your leg revealed a fracture in both your Tibia and Fibula. Orthopedics, Dr. Jackson was consulted and recommended surgical management. Regarding patient's cardiac history, cardio, Dr. Lin was consulted and due to patient's severe      SECONDARY DISCHARGE DIAGNOSES  Diagnosis: PAF (paroxysmal atrial fibrillation)  Assessment and Plan of Treatment: PAF (paroxysmal atrial fibrillation)    Diagnosis: CHF (congestive heart failure)  Assessment and Plan of Treatment: During your hospitalization, we discontinued your furosemide due to findings of acute kidney injury. Please follow up with primary care doctor in 1 week to repeat BMP to determine creatinine levels and whether or not to continue with furosemide. Continue with carvedilol as indicated.    Maintain healthy diet and excersise and lose weight.  If you eat too much salt or drink too much fluid, your body's water content may increase and make your heart work harder. This can worsen your CHF.  Follow up with your primary care physician in one week after discharge to inform of your hospitalization.    Diagnosis: Fall  Assessment and Plan of Treatment: PRINCIPAL DISCHARGE DIAGNOSIS  Diagnosis: Tibia fracture  Assessment and Plan of Treatment: You presented after a fall. X-rays of your leg revealed a fracture in both your Tibia and Fibula. Orthopedics, Dr. Jackson was consulted and recommended surgical management. Regarding patient's cardiac history, cardio, Dr. Lin was consulted and due to your severe aortic stenosis, it was decided that the operation would be high risk. This was discussed between the attendings and your primary decision maker, and the decision was made to proceed with conservative management. At this time you are to proceed to subacute rehab. Please follow up with your primary care physician in one week to inform them of your recent hospitalization. You are to follow up with orthopedics, Dr. Jackson in one week after discharge.      SECONDARY DISCHARGE DIAGNOSES  Diagnosis: PAF (paroxysmal atrial fibrillation)  Assessment and Plan of Treatment: Please continue with your anticoagulation medication as instructed in the medication reconciliation and your primary care physician.  Please continue with you rate control medication carvedilol as instructed in the medication reconciliation and your primary care physician.    Diagnosis: CHF (congestive heart failure)  Assessment and Plan of Treatment: During your hospitalization, we discontinued your furosemide due to findings of acute kidney injury. Please follow up with primary care doctor in 1 week to repeat BMP to determine creatinine levels and whether or not to continue with furosemide. Continue with carvedilol as indicated.    Maintain healthy diet and excersise and lose weight.  If you eat too much salt or drink too much fluid, your body's water content may increase and make your heart work harder. This can worsen your CHF.  Follow up with your primary care physician in one week after discharge to inform of your hospitalization.

## 2019-12-02 NOTE — PROGRESS NOTE ADULT - ASSESSMENT
89 Yo F with PMH of HTn, HLD, Paroxysmal Afib, CHB s/p PPM (2010 [employer] PPM), Hip fracture s/p surgical correction, CHF (unknown EF) came with pain in the R leg after she had a fall at home,NEELAM, Rt leg fx.  1.Tele monitoring.  2.Severe AS-pt at high risk for ananth-operative cardiac complication.  3.Anemia-Fe def, occult-p.  4.CRI-renal f/u-,SPEP.  5.PAF-asa, inc coreg 12.5mg bid.  6.Elevated uric acid-allopurinol.  7.Rt leg fx-Ortho f/u  8.GI and DVT prophylaxis.
89 Yo F with PMH of HTn, HLD, Paroxysmal Afib, CHB s/p PPM (2010 [employer] PPM), Hip fracture s/p surgical correction, CHF (unknown EF) came with pain in the R leg after she had a fall at home,NEELAM, Rt leg fx.  1.Tele monitoring.  2.Severe AS-pt at high risk for ananth-operative cardiac complication.  3.Anemia-Fe def, occult-p.  4.CRI-renal f/u-,SPEP.  5.PAF-asa,coreg.  6.Elevated uric acid-allopurinol.  7.Rt leg fx-Ortho f/u  8.GI and DVT prophylaxis.
89 Yo F with PMH of HTn, HLD, Paroxysmal Afib, CHB s/p PPM (2010 [employer] PPM), Hip fracture s/p surgical correction, CHF (unknown EF) came with pain in the R leg after she had a fall at home,NEELAM, Rt leg fx.  1.Tele monitoring.  2.Severe AS-pt at high risk for ananth-operative cardiac complication.  3.Anemia-Fe def, occult-p.  4.CRI-renal f/u-,SPEP.  5.PAF-asa, inc coreg 12.5mg bid.  6.Elevated uric acid-allopurinol.  7.Rt leg fx-Ortho f/u  8.GI and DVT prophylaxis.
89 Yo F with PMH of HTn, HLD, Paroxysmal Afib, [employer] scientific PPM (unknown when it was placed), Hip fracture s/p surgical correction, CHF (unknown EF) came with pain in the right leg after she had a fall at home.         XR showed mid shaft spiral fracture of the R tibia.  She is being admitted for mid shaft fracture of R tibia.  She is being admitte to tele for afib and ?possible cause of syncope.  Mildly elevated WBC-reactive leucocytosis      11/29/2019  wbc trended down  ORTHO CONSULT PENDING    11/30/2019  Ortho consult noted  patient and family are still deciding conservative vs surgical managment      12/1/19  - given the history of severe Aortic Stenosis and high medical risk for post op surgical complications  after a lengthy discussion with the patients HCP - Vic Kee at [phone] decision has been made to proceed with conservative management at this time.  - Pain control prn  - NWB RLE in splint, keep c/d/I, cane/crutches/walker as needed  - Ice/elevation
89 Yo F with PMH of HTn, HLD, Paroxysmal Afib, [place] scientific PPM (unknown when it was placed), Hip fracture s/p surgical correction, CHF (unknown EF) came with pain in the right leg after she had a fall at home.         XR showed mid shaft spiral fracture of the R tibia.        11/29/2019  wbc trended down  ORTHO CONSULT PENDING    11/30/2019  Ortho consult noted  patient and family deciding on conservative given severe AS and its associated high surgical risk      12/1/19  - given the history of severe Aortic Stenosis and high medical risk for post op surgical complications  after a lengthy discussion with the patients HCP - Vic Kee at [phone] decision has been made to proceed with conservative management at this time.  - Pain control prn  - NWB RLE in splint, keep c/d/I, cane/crutches/walker as needed  - Ice/elevation
89 Yo F with PMH of HTn, HLD, Paroxysmal Afib, [employer] scientific PPM (unknown when it was placed), Hip fracture s/p surgical correction, CHF (unknown EF) came with pain in the right leg after she had a fall at home.         XR showed mid shaft fracture of the R tibia.  She is being admitted for mid shaft fracture of R tibia.  She is being admitte to tele for afib and ?possible cause of syncope.  Mildly elevated WBC-reactive leucocytosis      11/29/2019  wbc trended down  ORTHO CONSULT PENDING    11/30/2019  Ortho consult noted  patient and family are still deciding conservative vs surgical managment
89 Yo F with PMH of HTn, HLD, Paroxysmal Afib, [place] scientific PPM (unknown when it was placed), Hip fracture s/p surgical correction, CHF (unknown EF) came with pain in the right leg after she had a fall at home.         XR showed mid shaft fracture of the R tibia.  She is being admitted for mid shaft fracture of R tibia.  She is being admitte to tele for afib and ?possible cause of syncope.  Mildly elevated WBC-reactive leucocytosis      11/29/2019  wbc trended down  ORTHO CONSULT PENDING

## 2019-12-02 NOTE — PROGRESS NOTE ADULT - SUBJECTIVE AND OBJECTIVE BOX
Pt Name: WASHINGTON DE LA ROSA  MRN: [id number]    ORTHOPEDICS    Orthopedic diagnosis: Right tib-fib fracture     89yFemaleHPI:  Patient seen and evaluated at bedside. Patient c/o mild right lower leg pain.  Pt denies Chest pain, SOB, dyspnea, paresthesias, N/V/D, abdominal pain, syncope, or pain anywhere else.         PHYSICAL EXAM:    Vital Signs Last 24 Hrs  T(C): 36.7 (02 Dec 2019 15:42), Max: 37.1 (02 Dec 2019 07:33)  T(F): 98.1 (02 Dec 2019 15:42), Max: 98.7 (02 Dec 2019 07:33)  HR: 71 (02 Dec 2019 15:42) (62 - 78)  BP: 152/56 (02 Dec 2019 15:42) (124/71 - 154/84)  BP(mean): --  RR: 18 (02 Dec 2019 15:42) (18 - 20)  SpO2: 96% (02 Dec 2019 15:42) (92% - 97%)    Gen: well developed, well nourished, comfortable  Musculoskeletal:    Right LE:  Splint C/D/I --- splint removed. Swelling noted to RLE. TTP over fracture site. No open wounds noted. All toes mobile with no pain. Compartments soft and compressible. NVI. SILT.       LABS:    TPro  5.4<L>  /  Alb  3.0<L>  /  TBili  x   /  DBili  x   /  AST  x   /  ALT  x   /  AlkPhos  x   11-30        IMPRESSION: Pt is a  89y Female with  Right tib-fib fracture    PLAN:   - Pain control  - NWB RLE in splint with appropriate assistive device  - Long posterior splint with U splint applied, well padded with Webril     > Post splint X-rays performed  -  Keep RLE elevated on 2-3 pillows  - Patient is to Follow-up with Dr. Jackson in ONE WEEK at [phone]   - Case d/w Dr. Jackson

## 2019-12-02 NOTE — DISCHARGE NOTE PROVIDER - CARE PROVIDER_API CALL
Goldberg, Steven M (MD)  Cardiovascular Disease; Internal Medicine  [address] [place] [id number]  Phone: [phone]  Fax: [phone]  Follow Up Time:     Ted Jackson)  Orthopaedic Surgery; Sports Medicine  [address] [place] [address], [address] [place] [address]  Phone: [phone]  Fax: [phone]  Follow Up Time:

## 2019-12-02 NOTE — PROGRESS NOTE ADULT - PROBLEM SELECTOR PLAN 3
Pt has h/o PAfib,  not on AC.  has [employer] scientific PPM, checked, reports in the file  EKG showed pacing with wide QRS complex  admitted to tele  cardiologist Dr balbuena
Pt has h/o PAfib,  not on AC.  has [employer] scientific PPM, checked, reports in the file  EKG showed pacing with wide QRS complex  admitted to tele  cardiologist Dr balbuena
Pt has h/o PAfib,  not on AC.  has [employer] scientific PPM, checked, reports in the file  EKG showed pacing with wide QRS complex  admitted to tele  cardiologist Dr balbuena
Pt has h/o PAfib,  not on AC.  has [place] scientific PPM, checked, reports in the file  EKG showed pacing with wide QRS complex  admitted to tele  cardiologist Dr balbuena

## 2019-12-02 NOTE — DISCHARGE NOTE PROVIDER - NSDCMRMEDTOKEN_GEN_ALL_CORE_FT
aspirin 81 mg oral tablet: 1 tab(s) orally once a day  atorvastatin 20 mg oral tablet: 1 tab(s) orally once a day  carvedilol 12.5 mg oral tablet: 1 tab(s) orally every 12 hours  ezetimibe 10 mg oral tablet: 1 tab(s) orally once a day  famotidine 40 mg oral tablet: 1 tab(s) orally once a day (at bedtime)  furosemide 40 mg oral tablet: 1.5 tab(s) orally once a day  lutein 20 mg oral capsule: 1 cap(s) orally once a day  sertraline 25 mg oral tablet: 1 tab(s) orally once a day (at bedtime)  Tylenol 500 mg oral tablet: 2 tab(s) orally 2 times a day (after meals)  Vitamin D3 2000 intl units oral capsule: 1 cap(s) orally once a day aspirin 81 mg oral tablet: 1 tab(s) orally once a day  atorvastatin 20 mg oral tablet: 1 tab(s) orally once a day  carvedilol 12.5 mg oral tablet: 1 tab(s) orally every 12 hours  ezetimibe 10 mg oral tablet: 1 tab(s) orally once a day  famotidine 40 mg oral tablet: 1 tab(s) orally once a day (at bedtime)  lutein 20 mg oral capsule: 1 cap(s) orally once a day  sertraline 25 mg oral tablet: 1 tab(s) orally once a day (at bedtime)  Tylenol 500 mg oral tablet: 2 tab(s) orally 2 times a day (after meals)  Vitamin D3 2000 intl units oral capsule: 1 cap(s) orally once a day

## 2019-12-02 NOTE — PROGRESS NOTE ADULT - PROBLEM SELECTOR PLAN 2
EKG showed pacing with wide qrs  Admitted to tele, pt has h/o PAF, AS which are risk factors  trop negative, ECHO normal ef with G1DD  Cardiologist Dr Lin consulted  PT eval once she stabilizes
She sat suddenly while standing.   EKG showed pacing with wide qrs  Admitted to tele, pt has h/o PAF, AS which are risk factors  trop negative, ECHO normal ef with G1DD  Cardiologist Dr Lin consulted  PT princessal

## 2019-12-02 NOTE — PROGRESS NOTE ADULT - PROBLEM SELECTOR PLAN 7
Has h/o HTN  takes coreg 3.125 morning and 6.25 evening, c/w home dose  monitor bp
IMPROVE VTE Individual Risk Assessment    RISK                                                                Points  [  ] Previous VTE                                                  3  [  ] Thrombophilia                                               2  [  ] Lower limb paralysis                                      2        (unable to hold up >15 seconds)    [  ] Current Cancer                                              2         (within 6 months)  [1  ] Immobilization > 24 hrs                                1  [ ] ICU/CCU stay > 24 hours                              1  [1  ] Age > 60                                                      1  IMPROVE VTE Score _____2____  IMPROVE Score 2-3: At risk, pharmacologic VTE prophylaxis is indicated for most patients (in the absence of a contraindication)  starting on heparin sub q

## 2019-12-02 NOTE — PROGRESS NOTE ADULT - PROBLEM SELECTOR PLAN 5
Pt has CKD, GFR was 37 on previous discharge, baseline cr 1.28  On presentation Cr was 1.7, today1.78  mild hydration   f/u BMp
Pt has CKD, GFR was 37 on previous discharge, baseline cr 1.28  On presentation Cr was 1.7, today1.78  mild hydration   f/u BMp
Pt has CKD, GFR was 37 on previous discharge, baseline cr 1.28  On presentation Cr was 1.7, today1.78  mild hydration attempted  f/u BMp
According to son pt has CHF, has been seeing Cardiologist who has been treating her with lasix and coreg  Hold lasix as of now in setting of [place]win  c/w coreg   ECHO showed normal ef with g1dd

## 2019-12-02 NOTE — PROGRESS NOTE ADULT - PROBLEM SELECTOR PLAN 6
According to son pt has CHF, has been seeing Cardiologist who has been treating her with lasix and coreg  Hold lasix as of now in setting of Sherwin  c/w coreg   ECHO showed normal ef with g1dd
Has h/o HTN  takes coreg 3.125 morning and 6.25 evening, c/w home dose  monitor bp

## 2019-12-02 NOTE — DISCHARGE NOTE PROVIDER - HOSPITAL COURSE
89 Yo F with PMH of HTn, HLD, Paroxysmal Afib, CHB s/p PPM (2010 [employer] PPM), Hip fracture s/p surgical correction, CHF (unknown EF) came with pain in the R leg after she had a fall at home. She lives alone with HHA 24x7 at home and walks with a walker. Aid was helping her with the dress while she suddenly sat down. She denies lightheadedness weakness, dizziness, blurry vision, loss of balance, palpitations before the fall. States that while sitting down her leg tripped and she felt pain. According to son, she complained pain when EMS was placing her on the stretcher. He also states that aid has not been taking good care of her and has been having few incidents for the past few months. Recently she hit her L great toe and injured it while walking at home. She visits her cardiologist and PCP frequently and everything has been well. Patient is mildly demented and her h/o is not completely reliable.    Patient admitted for further evaluation for fracture. Orthopedics consulted, Dr. Jackson. Recommended surgical management. Cardio also consulted regarding patient's cardiac history. Due to severe aortic stenosis patient is a poor candidate for surgery. Patient's son and orthopedics had discussion. Patients decision maker decided on conservative management. patient seen by PT, recommended CRISTOPHER.     Patient's xray of lower extremity revealed fracture of tibia and fibula. Xrays of the hip and pelvis were negative.     Patient is stable for discharge per attending and is advised to follow up with PCP as outpatient    Please refer to patient's complete medical chart with documents for a full hospital course, for this is only a brief summary.

## 2019-12-02 NOTE — PROGRESS NOTE ADULT - SUBJECTIVE AND OBJECTIVE BOX
WASHINGTON DE LA ROSA  MR# [id number]  89yFemale        Patient is a 89y old  Female who presents with a chief complaint of Fall (02 Dec 2019 08:58)      INTERVAL HPI/OVERNIGHT EVENTS:  Patient seen and examined at bedside. No notations of chest pain, palpitation, SOB, orthopnea, nausea, vomiting or abdominal pain.    ALLERGIES  No Known Allergies      MEDICATIONS  acetaminophen   Tablet .. 650 milliGRAM(s) Oral every 6 hours PRN Mild Pain (1 - 3), Moderate Pain (4 - 6)  allopurinol 100 milliGRAM(s) Oral daily  artificial  tears Solution 1 Drop(s) Both EYES two times a day  aspirin  chewable 81 milliGRAM(s) Oral daily  atorvastatin 20 milliGRAM(s) Oral daily  carvedilol 12.5 milliGRAM(s) Oral every 12 hours  famotidine    Tablet 20 milliGRAM(s) Oral daily  heparin  Injectable 5000 Unit(s) SubCutaneous every 12 hours  iron sucrose IVPB 100 milliGRAM(s) IV Intermittent every 24 hours  sertraline 25 milliGRAM(s) Oral at bedtime              REVIEW OF SYSTEMS:  CONSTITUTIONAL: No fever, weight loss, or fatigue  EYES: No eye pain, visual disturbances, or discharge  ENT:  No difficulty hearing, tinnitus, vertigo; No sinus or throat pain  NECK: No pain or stiffness  RESPIRATORY: No cough, wheezing, chills or hemoptysis; No Shortness of Breath  CARDIOVASCULAR: No chest pain, palpitations, passing out, dizziness, or leg swelling  GASTROINTESTINAL: No abdominal or epigastric pain. No nausea, vomiting, or hematemesis; No diarrhea or constipation. No melena or hematochezia.  GENITOURINARY: No dysuria, frequency, hematuria, or incontinence  NEUROLOGICAL: No headaches, memory loss, loss of strength, numbness, or tremors  SKIN: No itching, burning, rashes, or lesions   LYMPH Nodes: No enlarged glands  ENDOCRINE: No heat or cold intolerance; No hair loss  MUSCULOSKELETAL: No joint pain or swelling; No muscle, back, or extremity pain  PSYCHIATRIC: No depression, anxiety, mood swings, or difficulty sleeping  HEME/LYMPH: No easy bruising, or bleeding gums  ALLERGY AND IMMUNOLOGIC: No hives or eczema	    [ ] All others negative	  [ ] Unable to obtain      T(C): 36.7 (12-02-19 @ 11:20), Max: 37.1 (12-02-19 @ 07:33)  T(F): 98 (12-02-19 @ 11:20), Max: 98.7 (12-02-19 @ 07:33)  HR: 70 (12-02-19 @ 11:20) (62 - 88)  BP: 141/65 (12-02-19 @ 11:20) (118/79 - 154/84)  RR: 18 (12-02-19 @ 11:20) (18 - 20)  SpO2: 97% (12-02-19 @ 11:20) (92% - 98%)  Wt(kg): --    I&O's Summary    01 Dec 2019 07:01  -  02 Dec 2019 07:00  --------------------------------------------------------  IN: 380 mL / OUT: 2 mL / NET: 378 mL    02 Dec 2019 07:01  -  02 Dec 2019 13:39  --------------------------------------------------------  IN: 430 mL / OUT: 0 mL / NET: 430 mL          PHYSICAL EXAM:  A X O x  HEAD:  Atraumatic, Normocephalic  EYES: EOMI, PERRLA, conjunctiva and sclera clear  NECK: Supple, No JVD, Normal thyroid  Resp: CTAB, No crackles, wheezing,   CVS: Regular rate and rhythm; No discernable murmurs, rubs, or gallops  ABD: Soft, Nontender, Nondistended; Bowel sounds present  EXTREMITIES:  2+ Peripheral Pulses, No edema  LYMPH: No dicernable lymphadenopathy noted  GENERAL: NAD, well-groomed, well-developed      LABS:        TPro  5.4<L>  /  Alb  3.0<L>  /  TBili  x   /  DBili  x   /  AST  x   /  ALT  x   /  AlkPhos  x   11-30        CAPILLARY BLOOD GLUCOSE          Troponins:  ProBNP:  Lipid Profile:   HgA1c:  TSH:           RADIOLOGY & ADDITIONAL TESTS:    Imaging Personally Reviewed:  [ ] YES  [ ] NO      Consultant(s) Notes Reviewed:  [x ] YES  [ ] NO    Care Discussed with Consultants/Other Providers [ x] YES  [ ] NO          PAST MEDICAL & SURGICAL HISTORY:  Essential hypertension  Hypertension  Endogenous hyperlipemia  PAF (paroxysmal atrial fibrillation): 1/7/2016  Thyroid nodule  Complete heart block: 2010  No significant past surgical history  Status post pericardiocentesis: 5/18/2016  Elective surgery: excision of thyroid nodule  History of cardiac pacemaker: 2010        Unspecified fracture of shaft of unspecified tibia, initial encounter for closed fracture  H/o or current diagnosis of HF- no contraindication to ACEI/ARBs  No pertinent family history in first degree relatives  Handoff  MEWS Score  Essential hypertension  Hypertension  Endogenous hyperlipemia  PAF (paroxysmal atrial fibrillation)  Thyroid nodule  Complete heart block  Tibia fracture  Other closed fracture of proximal end of right fibula, initial encounter  Closed displaced spiral fracture of shaft of right tibia, initial encounter  UTI (urinary tract infection)  Prophylactic measure  Hypertension  CHF (congestive heart failure)  Acute kidney injury superimposed on CKD  PAF (paroxysmal atrial fibrillation)  Fall  Tibia fracture  No significant past surgical history  Status post pericardiocentesis  Elective surgery  History of cardiac pacemaker  A S/P FALL  89+  Fall

## 2019-12-02 NOTE — PROGRESS NOTE ADULT - SUBJECTIVE AND OBJECTIVE BOX
CHIEF COMPLAINT:Patient is a 89y old  Female who presents with a chief complaint of Fall .Pt appears comfortable.    	  REVIEW OF SYSTEMS:  CONSTITUTIONAL: No fever, weight loss, or fatigue  EYES: No eye pain, visual disturbances, or discharge  ENT:  No difficulty hearing, tinnitus, vertigo; No sinus or throat pain  NECK: No pain or stiffness  RESPIRATORY: No cough, wheezing, chills or hemoptysis; No Shortness of Breath  CARDIOVASCULAR: No chest pain, palpitations, passing out, dizziness, or leg swelling  GASTROINTESTINAL: No abdominal or epigastric pain. No nausea, vomiting, or hematemesis; No diarrhea or constipation. No melena or hematochezia.  GENITOURINARY: No dysuria, frequency, hematuria, or incontinence  NEUROLOGICAL: No headaches, memory loss, loss of strength, numbness, or tremors  SKIN: No itching, burning, rashes, or lesions   LYMPH Nodes: No enlarged glands  ENDOCRINE: No heat or cold intolerance; No hair loss  MUSCULOSKELETAL: No joint pain or swelling; No muscle, back, or extremity pain  PSYCHIATRIC: No depression, anxiety, mood swings, or difficulty sleeping  HEME/LYMPH: No easy bruising, or bleeding gums  ALLERGY AND IMMUNOLOGIC: No hives or eczema	      PHYSICAL EXAM:  T(C): 37.1 (12-02-19 @ 07:33), Max: 37.1 (12-02-19 @ 07:33)  HR: 62 (12-02-19 @ 07:33) (62 - 88)  BP: 124/71 (12-02-19 @ 07:33) (118/79 - 167/79)  RR: 20 (12-02-19 @ 07:33) (18 - 20)  SpO2: 96% (12-02-19 @ 07:33) (92% - 98%)  Wt(kg): --  I&O's Summary    01 Dec 2019 07:01  -  02 Dec 2019 07:00  --------------------------------------------------------  IN: 380 mL / OUT: 2 mL / NET: 378 mL        Appearance: Normal	  HEENT:   Normal oral mucosa, PERRL, EOMI	  Lymphatic: No lymphadenopathy  Cardiovascular: Normal S1 S2, 2/6sm  Respiratory: Lungs clear to auscultation	  Psychiatry: A & O x 3, Mood & affect appropriate  Gastrointestinal:  Soft, Non-tender, + BS	  Skin: No rashes, No ecchymoses, No cyanosis	  Neurologic: Non-focal  Extremities: Normal range of motion, No clubbing, cyanosis or edema  Vascular: Peripheral pulses palpable 2+ bilaterally    MEDICATIONS  (STANDING):  allopurinol 100 milliGRAM(s) Oral daily  artificial  tears Solution 1 Drop(s) Both EYES two times a day  aspirin  chewable 81 milliGRAM(s) Oral daily  atorvastatin 20 milliGRAM(s) Oral daily  carvedilol 12.5 milliGRAM(s) Oral every 12 hours  famotidine    Tablet 20 milliGRAM(s) Oral daily  heparin  Injectable 5000 Unit(s) SubCutaneous every 12 hours  iron sucrose IVPB 100 milliGRAM(s) IV Intermittent every 24 hours  sertraline 25 milliGRAM(s) Oral at bedtime      	  LABS:	 	    TPro  5.4<L>  /  Alb  x   /  TBili  x   /  DBili  x   /  AST  x   /  ALT  x   /  AlkPhos  x   11-30      Lipid Profile: Cholesterol 183  LDL 82  HDL 39      HgA1c: Hemoglobin A1C, Whole Blood: 5.4 % (11-29 @ 09:06)    TSH: Thyroid Stimulating Hormone, Serum: 5.20 uU/mL (11-29 @ 06:59)

## 2019-12-02 NOTE — DISCHARGE NOTE NURSING/CASE MANAGEMENT/SOCIAL WORK - PATIENT PORTAL LINK FT
You can access the FollowMyHealth Patient Portal offered by [facility] by registering at the following website: [email or web address]. By joining [employer]’s FollowMyHealth portal, you will also be able to view your health information using other applications (apps) compatible with our system.

## 2019-12-02 NOTE — PROGRESS NOTE ADULT - PROBLEM SELECTOR PROBLEM 5
Acute kidney injury superimposed on CKD
CHF (congestive heart failure)

## 2019-12-02 NOTE — PROGRESS NOTE ADULT - PROBLEM SELECTOR PROBLEM 6
CHF (congestive heart failure)
Hypertension

## 2019-12-02 NOTE — DISCHARGE NOTE PROVIDER - NSDCFUADDINST_GEN_ALL_CORE_FT
- Pain control as needed   - NWB RLE in splint, keep clean/dry/Intact, use cane/crutches/walker as needed  - Ice and elevation  - Follow up with Dr. Jackson in 1 week, call [phone] for appointment

## 2019-12-02 NOTE — PROGRESS NOTE ADULT - PROBLEM SELECTOR PROBLEM 4
UTI (urinary tract infection)
Acute kidney injury superimposed on CKD

## 2019-12-02 NOTE — PROGRESS NOTE ADULT - PROBLEM SELECTOR PLAN 4
Has UA positive  f/u urine cx  start antbx if ucx positive  not starting antbx as she is asymptomatic
Has UA positive  f/u urine cx  start antbx if ucx positive, likely contamination at present  not starting antbx as she is asymptomatic
Has UA positive  f/u urine cx  start antbx if ucx positive, likely contamination at present  not starting antbx as she is asymptomatic
Pt has CKD, GFR was 37 on previous discharge, baseline cr 1.28  On presentation Cr was 1.7, today1.78  mild hydration   f/u BMp

## 2020-03-31 NOTE — ED ADULT TRIAGE NOTE - SPO2 (%)
[facility] Transitional Care Coordination     Insurance has authorized inpatient rehab.  Auth #[id number]    98

## 2020-05-06 NOTE — PROGRESS NOTE ADULT - PROBLEM/PLAN-8
Child/Adolescent Psychiatry consult Note      Patient Name:  Aria Almonte   MR#:  [id number]    Date of Session:  5/6/2020       Start time/Stop time:  9:30 a.m./10:15 a.m.    History of present illness    Aria is a 12 year old female seen for medication evaluation.This visit was performed via live interactive two-way video with patient's verbal consent.  Mother also was present and gave verbal consent as well  Clinician Location:  Home  Patient Location:  Home  Patient is living at home with her mother age 31 and mother's significant other Isac who was been in the family for 6 and half years.  Parents were  when the patient was 4 years of age.  Her father is 36 years of age and living with his significant other and Gogo along with her children which is a girl and 2 boys.  Mother does not allow the patient to go to father's house because of bullying by the marcus as well as conflicts with Gogo as well as father's mental health issues.  Typically father would stop by once a week to take the patient out to dinner although this has been somewhat reduced secondary to the corona virus.  Patient reports depression beginning approximately a year ago in the fall with this school year.  She is in the 7th grade at [employer].  There was some bullying issues occurring at school.  Also approximately a year and half ago the patient was at the father's house when he turned off all the lights and told her he was going to hang himself.  Patient left the apartment and called mother.  Since that point in time mother has only left the father stop by and take the patient out to dinner.  Mother also had concerns about the conflicts between the patient and Gogo as well as the marcus who is the patient's age.  The patient and the stepsister attend the same school and there reports the stepsister was sabotaging the patient's friends.  As the depression continued this school year the patient denied any  suicidal thinking but did begin self-harm by poking herself with pins.  This did resolve 2-3 months ago.  Mother also began along the father to have the weekly dinners with the patient as the patient did miss having contact with her father.  She also began seeing a therapist Nayely in the [place] area.  The mood did appear to improve but then Nayely stated she could no longer see the patient because of “a conflict of interest “.  No current therapist.  Some problems falling asleep.  Appetite is variable no weight loss.  No eating disorder symptoms.  She does describe a generalized anxiety at times as she worries about what her peers think about her.  She reports she is handling the corona virus issue fairly well.  No panic attacks.  There are also significant concerns though regarding ADHD symptoms.  She struggles to maintain her focus.  She forgets to hand in assignments.  She describes impulsivity and rushing through our work.  Mother still has to check her school work daily to make sure things are handed or sent in.  She also appears to be fidgety at times always needs to have something in her hands.  Psychiatric History    No current therapist.  She saw Nayely twice.  No medication trial    Medical history    No past medical history on file.    Allergies    ALLERGIES:  No Known Allergies    Developmental history  Full 9 month pregnancy.  A regular delivery.  Birth weight was 8 lb 10 oz.  She went home after 2 days.  Parents  when she was 4 years of age.  She attended [employer] where she received speech therapy up until middle school for speech delays.  She has been a.m. B student although this school year was struggling to keep up.  Again concerns regarding ADHD as the academics has been getting more challenging for her.  No current IEP or 504    Abuse/neglect history    None reported    Family history    Father has a history of depression with the reported suicidal statements  in October of 2018. Mother does not know about father's extended family.  Mother states she has a history of ADHD and depression.  She reports her mother has bipolar disorder.  She describes spending sprees for her mother with episodes of high energy as well as episodes of severe depression.      Substance use/abuse history    Social History     Tobacco Use   Smoking Status Never Smoker   Smokeless Tobacco Never Used     Social History     Substance and Sexual Activity   Alcohol Use Not on file     Social History     Substance and Sexual Activity   Drug Use Not on file       Other Agency Involvement    (Legal or Placement Issues)   none    Risk Assessment    (Homicide/Suicide Ideation/Plan/Threat of Harm to Self/Others)  Minimal on today's date.  No self-harm currently with the most recent 2-3 months ago.    Mental Status Exam    Orientation:  X3  Appearance:  Average height and weight  Speech:  Clear and coherent  Eye contact:  Good  Behavior:  Cooperative  Psychomotor:  No anxiety or agitation  Mood:  Neutral   Affect:  Bright  Thought Process:  Goal directed  Thought Content:  No voices or visions      Suicidal Ideation:  None reported      Homicidal Ideation:  Not applicable  Insight:  Fair  Judgment:  Fair  Sensorium:  Intact  Cognition:  Average    Diagnoses  major depression, mild, single episode; ADHD combined type    Medications / Response to Trials    Current Outpatient Medications   Medication Sig Dispense Refill   • famotidine (PEPCID) 20 MG tablet Take 1 tablet by mouth 2 times daily. 60 tablet 1     No current facility-administered medications for this visit.          Informed Consent for Psychotropic Medications Prescribed Signed by Patient/Parent/Legal Guardian    Lab / consult results          treatment Plan / Recommendations    Options discussed.  We discussed the option of fluoxetine 10 mg once a day for depression.  Patient and mother stated that her depression has been better the last few weeks.   We discussed the option of stimulants for ADHD.  Mother and patient stated that was their preference at this time given her struggles within the school setting.  Consent was obtained for Vyvanse 10 mg once a day which was sent to the pharmacy.  She was encouraged to have breakfast before the Vyvanse.  I will see the patient in 2 months but mother is to call me in 1 month to adjust the Vyvanse upwards if needed.  We discussed calling [facility] scheduling for therapist at the [facility].  We discussed and encouraged good food choices as well as scheduled exercise.  I will see her in 2 months but again mother's to call me in 1 month for medication update    Op mental health psychologist or psychotheRapist    Bret Bautista MD   DISPLAY PLAN FREE TEXT

## 2020-06-09 ENCOUNTER — APPOINTMENT (OUTPATIENT)
Dept: SURGICAL ONCOLOGY | Facility: CLINIC | Age: 85
End: 2020-06-09

## 2020-06-17 NOTE — ED ADULT NURSE NOTE - NSSEPSISNEWALTERMENTAL_ED_A_ED
Pts daughter given update on pt status via phone. Notified that there was another test ordered and waiting results for disposition.      Elda Fontana RN  06/17/20 1385     No

## 2021-06-08 NOTE — PATIENT PROFILE ADULT - ANY IMPAIRED UPPER EXTREMITY FUNCTION WITHIN A WEEK PRIOR TO ADMISSION RELATED TO?
Pt employer is requiring TB test. Pt is scheduled for lab on 6/14 - order needs to be placed please.   no

## 2021-06-10 NOTE — ED PROVIDER NOTE - OBJECTIVE STATEMENT
How Severe Are Your Spot(S)?: mild
Have Your Spot(S) Been Treated In The Past?: has not been treated
Hpi Title: Evaluation of Skin Lesions
Year Removed: 1900
89 y/o F pt w/ hx of HTN, endogenous hyperlipemia, paroxysmal Afib, cardiac pacemaker c/o R leg pain s/p fall x today. Pt was at [facility] when she had mechanical fall onto R side. Worse when moving R leg. Better when not moving. Denies head trauma, HA,  abd pain, CP, and any other complaints. NKDA.

## 2021-07-04 NOTE — CONSULT NOTE ADULT - PROVIDER SPECIALTY LIST ADULT
On-Call Note:    Paged for \"Patient is vomiting and badly congested, this is Day 2; will check his temp today\". Notes:   Mom notes pt had vomited x 2 yesterday but feeding today without problems. No further vomiting today. No other GI or respiratory symptoms noted. She has not checked temp (was at mom's and didn't have thermometer), but estimated low-grade subjectively. Reviewed evaluation over weekend/holiday if needed. Mom aware can contact clinic next week for follow-up if needed.
Orthopedics
Cardiology
Nephrology

## 2023-01-16 NOTE — PATIENT PROFILE ADULT - FALL HARM RISK
Group Topic:  Group OT    Date: 1/16/2023  Start Time: 1330  End Time: 1430  Facilitators: Giulia Kaur OT    Focus: Psych Ed. - Discussion/presentation on healthy ways to stop procrastination.  Number in attendance: 10    Method: Group  Attendance: Present  Participation: Minimal  Patient Response: Quiet  Mood: appropriate  Behavior/Socialization: Cooperative  Thought Process: Focused  Task Performance: Follows directions  Patient Evaluation: Independent - full participation   Pt had good eye contact and appeared to be listening but he was  mostly quiet.        age(85 years old or older)/other

## 2023-04-23 NOTE — ED ADULT NURSE NOTE - NSSUHOSCREENINGYN_ED_ALL_ED
Problem: Pain  Goal: #Acceptable pain level achieved/maintained at rest using NRS/Faces  Description: This goal is used for patients who can self-report.  Acceptable means the level is at or below the identified comfort/function goal.  Outcome: Outcome Met, Continue evaluating goal progress toward completion  Goal: # Acceptable pain level achieved/maintained at rest using NRS/Faces without oversedation (opioid naive or PCA/Epidural infusion)  Description: This goal is used if Opioid-naïve or on PCA/Epidural Infusion.  Outcome: Outcome Met, Continue evaluating goal progress toward completion  Goal: # Acceptable pain level achieved/maintained with activity using NRS/Faces  Description: This goal is used for patients who can self-report and are not achieving acceptable pain control during activity.  Outcome: Outcome Met, Continue evaluating goal progress toward completion  Goal: Acceptable pain/comfort level is achieved/maintained at rest (based on Pain Behaviors Scale)  Description: This goal is used for patients who are not able to self-report pain and are assessed for pain using the Pain Behaviors Scale  Outcome: Outcome Met, Continue evaluating goal progress toward completion  Goal: Acceptable pain/comfort level is achieved/maintained at rest based on PAINAID scale (Dementia)  Description: This goal is used for patients who are not able to self-report pain, have dementia, and assessed using the PAINAD scale.  Outcome: Outcome Met, Continue evaluating goal progress toward completion  Goal: Acceptable pain/comfort level is achieved/maintained at rest (based on pediatric behavior tool: NIPS, NPASS, or FLACC)  Description: This goal is used for pediatric patients who are not able to self report pain.  Outcome: Outcome Met, Continue evaluating goal progress toward completion  Goal: # Verbalizes understanding of pain management  Description: Documented in Patient Education Activity  Outcome: Outcome Met, Complete  Goal  Goal: Verbalizes understanding and effective use of Patient Controlled Analgesia (PCA)  Description: Documented in Patient Education Activity  This goal is used for patients with PCA  Outcome: Outcome Not Met, Continue to Monitor  Goal: Maximum comfort achieved/maintained at end of life (Hospice)  Outcome: Outcome Not Met, Continue to Monitor      Yes - the patient is able to be screened

## 2023-05-30 NOTE — ED PROVIDER NOTE - CPE EDP RESP NORM
Padmini in room as chaperone Rectal exam: normal external exam  Anoscopy performed using self lighted anoscope purulent discharge with mild proctitis normal...

## 2024-08-13 NOTE — ED ADULT TRIAGE NOTE - NS AS WEIGHT METHOD - PEDI/INFANT
Continuity of Care Form    Patient Name: Jesus Stevens   :  1959  MRN:  [id number]    Admit date:  2024  Discharge date:  ***    Code Status Order: Full Code   Advance Directives:   Advance Care Flowsheet Documentation             Admitting Physician:  Margarita Nix MD  PCP: No primary care provider on file.    Discharging Nurse: ***  Discharging Hospital Unit/Room#: 2047/2047-01  Discharging Unit Phone Number: ***    Emergency Contact:   Extended Emergency Contact Information  Primary Emergency Contact: Brandy Porter  Home Phone: [phone]  Work Phone: [phone]  Mobile Phone: [phone]  Relation: Friend    Past Surgical History:  Past Surgical History:   Procedure Laterality Date    ADENOIDECTOMY      COLONOSCOPY      COLONOSCOPY  2021    COLORECTAL CANCER SCREENING, NOT HIGH RISK, POLYPECTOMY    COLONOSCOPY N/A 2021    COLONOSCOPY POLYPECTOMY HOT BIOPSY [facility]    HERNIA REPAIR      OTHER SURGICAL HISTORY  2022    impulse optimizer    TONSILLECTOMY  1963   exact date unknown       Immunization History:   Immunization History   Administered Date(s) Administered    COVID-19, PFIZER Bivalent, DO NOT Dilute, (age 12y+), IM, 30 mcg/0.3 mL 10/24/2022    COVID-19, PFIZER GRAY top, DO NOT Dilute, (age 12 y+), IM, 30 mcg/0.3 mL 2022    COVID-19, PFIZER PURPLE top, DILUTE for use, (age 12 y+), 30mcg/0.3mL 2021, 2021    Influenza Virus Vaccine 2010    PPD Test 10/07/2010    Pneumococcal, PCV20, PREVNAR 20, (age 6w+), IM, 0.5mL 10/13/2023    Pneumococcal, PPSV23, PNEUMOVAX 23, (age 2y+), SC/IM, 0.5mL 2013    TDaP, ADACEL (age 10y-64y), BOOSTRIX (age 10y+), IM, 0.5mL 2020       Active Problems:  Patient Active Problem List   Diagnosis Code    HTN (hypertension) I10    Skin tag L91.8    Low back pain with sciatica M54.40    Hyperglycemia R73.9    Infected sebaceous cyst L72.3, L08.9    Chronic midline low  back pain without sciatica M54.50, G89.29    Chronic pain of right knee M25.561, G89.29    Chest pain, unspecified R07.9    Chronic bronchitis ([facility]) J42    Chronic systolic congestive heart failure ([facility]) I50.22    Ventral hernia K43.9    Epigastric pain R10.13    Elevated LFTs R79.89    Shortness of breath R06.02    Rectus diastasis M62.08    Lumbosacral spondylosis without myelopathy M47.817    COPD exacerbation ([facility]) J44.1    Mixed simple and mucopurulent chronic bronchitis ([facility]) J41.8    VANDANA (obstructive sleep apnea) G47.33    Tachycardia R00.0    Chest pain R07.9    Type 2 diabetes mellitus with obesity ([facility]) E11.69, E66.9    Need for shingles vaccine Z23    Other male erectile dysfunction N52.8    Hernia of abdominal cavity K46.9    Hypokalemia E87.6    Atypical chest pain R07.89    Angina pectoris ([facility]) I20.9       Isolation/Infection:   Isolation            No Isolation          Patient Infection Status       None to display                     Nurse Assessment:  Last Vital Signs: /71   Pulse 70   Temp 97.5 °F (36.4 °C)   Resp 18   Ht 1.753 m (5' 9\")   Wt 119.7 kg (264 lb)   SpO2 100%   BMI 38.99 kg/m²     Last documented pain score (0-10 scale): Pain Level: 7  Last Weight:   Wt Readings from Last 1 Encounters:   08/12/24 119.7 kg (264 lb)     Mental Status:  {IP PT MENTAL STATUS:47899}    IV Access:  { LEIGH IV ACCESS:842660444}    Nursing Mobility/ADLs:  Walking   {CHP DME ADLs:212998348}  Transfer  {CHP DME ADLs:252544217}  Bathing  {CHP DME ADLs:296617097}  Dressing  {CHP DME ADLs:928584666}  Toileting  {CHP DME ADLs:128025074}  Feeding  {CHP DME ADLs:602236274}  Med Admin  {CHP DME ADLs:805658720}  Med Delivery   { LEIGH MED Delivery:362265836}    Wound Care Documentation and Therapy:  Incision 02/01/22 Chest Left;Upper (Active)   Number of days: 923        Elimination:  Continence:   Bowel: {YES / NO:84219}  Bladder: {YES / NO:19727}  Urinary Catheter: {Urinary Catheter:823839943}    Colostomy/Ileostomy/Ileal Conduit: {YES / NO:51937}       Date of Last BM: ***  No intake or output data in the 24 hours ending 24 1217  No intake/output data recorded.    Safety Concerns:     { LEIGH Safety Concerns:393298966}    Impairments/Disabilities:      { LEIGH Impairments/Disabilities:888659952}    Nutrition Therapy:  Current Nutrition Therapy:   { LEIGH Diet List:463783891}    Routes of Feeding: {[facility] DME Other Feedings:693283456}  Liquids: {Slp liquid thickness:70471}  Daily Fluid Restriction: {[facility] DME Yes amt example:860923666}  Last Modified Barium Swallow with Video (Video Swallowing Test): {Done Not Done Date:467849709}    Treatments at the Time of Hospital Discharge:   Respiratory Treatments: ***  Oxygen Therapy:  {Therapy; copd oxygen:74063}  Ventilator:    { CC Vent List:112767252}    Rehab Therapies: {THERAPEUTIC INTERVENTION:2368364836}  Weight Bearing Status/Restrictions: {[facility] Weight Bearin}  Other Medical Equipment (for information only, NOT a DME order):  {EQUIPMENT:582337792}  Other Treatments: ***    Patient's personal belongings (please select all that are sent with patient):  {[facility] DME Belongings:530440279}    RN SIGNATURE:  {Esignature:120475899}    CASE MANAGEMENT/SOCIAL WORK SECTION    Inpatient Status Date: ***    Readmission Risk Assessment Score:  Readmission Risk              Risk of Unplanned Readmission:  16           Discharging to Facility/ Agency   Name:   Address:  Phone:  Fax:    Dialysis Facility (if applicable)   Name:  Address:  Dialysis Schedule:  Phone:  Fax:    / signature: {Esignature:510912659}    PHYSICIAN SECTION    Prognosis: {Prognosis:1791499202}    Condition at Discharge: { Patient Condition:961718879}    Rehab Potential (if transferring to Rehab): {Prognosis:5295580288}    Recommended Labs or Other Treatments After Discharge: ***    Physician Certification: I certify the above information and transfer of Jesus JOHNSON  Rodney  is necessary for the continuing treatment of the diagnosis listed and that he requires {Admit to Appropriate Level of Care:02220} for {GREATER/LESS:683807147} 30 days.     Update Admission H&P: {CHP DME Changes in HandP:565698959}    PHYSICIAN SIGNATURE:  {Esignature:072315182}   stated

## 2024-10-15 NOTE — ED PROVIDER NOTE - CHPI ED SYMPTOMS POS
Spoke to mother.  Pt on oral steroids for asthma exacerbation.  Mom wants to know if ok to attend school while on steroids.  Ok to return, but recd that mom let teachers know due to side effect of excitability.  Asthma is well controlled with budesonide, albuterol via  nebulizer.  Pt does have cough.  Recd that if cough no better in 2-3 days, appt needed.  Mom verbalized understanding.   KE    ----- Message from Marisol sent at 10/15/2024 11:11 AM CDT -----  Contact: [phone]  .[email or web address]     Patient is calling for Medical Advice regarding:speak to the office     How long has patient had these symptoms:    Pharmacy name and phone#:    [facility] a call back or [facility]:call back     Comments:  Pts mother is calling she states the pt is on a steroid and she is asking if the pt can go back to school or not   Please advise patient replies from provider may take up to 48 hours.   LACERATION

## 2024-11-09 NOTE — ED ADULT NURSE NOTE - ATTEMPT TO OOB
65 yo female, PMH DM2, HLD, endometrial cancer (reportedly in remission), hx/o prior PTA in 2020, and recent parapharyngeal abscess s/p I&D 7/2024; pt presented to this ED after being directed by outpatient ENT Dr. Wells for concerns of recurrent parapharyngeal abscess.  Pt admits to fevers since 11/6/24, nausea, vomiting, and sore throat.  In the ED, VSS, Tmax 100.4.  WBC 12.86; CMP: bicarb 19, anion gap 18, glucose 141.  CT neck soft tissue w/ IV contrast was performed; per official radiology report: "...IMPRESSION: Left parapharyngeal space abscess, 1.4 cm in greatest dimension, no significant mass effect on the airway. The left facial artery courses either through or immediately adjacent to the left posterior corner of the abscess.".  ENT was consulted; pt was scoped with left pharyngeal wall edema reported; ENT advised to continue with IV antibiotics (Unasyn was given in the ED), Decadron 10 Q8H x 3 doses, and NPO after midnight; ENT team is following.  Pt was sent to CDU for continued care. 65 yo female, PMH pre-diabetes (pt denies hx/o DM), HLD, endometrial cancer (reportedly in remission), hx/o prior PTA in 2020, and recent parapharyngeal abscess s/p I&D 7/2024; pt presented to this ED on 11/7/24 after being directed by outpatient ENT Dr. Wells for concerns of recurrent parapharyngeal abscess.  Pt admitted to fevers since 11/6/24, nausea, vomiting, and sore throat.  In the ED, VSS, Tmax 100.4.  WBC 12.86; CMP: bicarb 19, anion gap 18, glucose 141.  CT neck soft tissue w/ IV contrast was performed; per official radiology report: "...IMPRESSION: Left parapharyngeal space abscess, 1.4 cm in greatest dimension, no significant mass effect on the airway. The left facial artery courses either through or immediately adjacent to the left posterior corner of the abscess.".  ENT was consulted; pt was scoped with left pharyngeal wall edema reported; ENT advised to continue with IV antibiotics (Unasyn was given in the ED), Decadron 10 Q8H x 3 doses, and NPO after midnight; ENT team is following.  Pt was sent to CDU for continued care.  In CDU, pt reassessed periodically by ENT; advised to stay for additional 24 hours for continued care/tx/monitoring.  In the interim, pt objectively noted to be resting comfortably; pt has been clinically stable; no issues thus far. no

## 2025-05-01 NOTE — ED PROVIDER NOTE - NEUROLOGICAL, MLM
denies etoh and drug abuse/caffeine Alert and oriented, no focal deficits, no motor or sensory deficits.